# Patient Record
Sex: FEMALE | Race: ASIAN | Employment: OTHER | URBAN - METROPOLITAN AREA
[De-identification: names, ages, dates, MRNs, and addresses within clinical notes are randomized per-mention and may not be internally consistent; named-entity substitution may affect disease eponyms.]

---

## 2017-06-12 ENCOUNTER — OFFICE VISIT (OUTPATIENT)
Dept: FAMILY MEDICINE CLINIC | Facility: CLINIC | Age: 57
End: 2017-06-12

## 2017-06-12 VITALS
WEIGHT: 130 LBS | DIASTOLIC BLOOD PRESSURE: 80 MMHG | BODY MASS INDEX: 26.56 KG/M2 | TEMPERATURE: 97 F | HEART RATE: 76 BPM | SYSTOLIC BLOOD PRESSURE: 124 MMHG | HEIGHT: 58.5 IN | RESPIRATION RATE: 14 BRPM

## 2017-06-12 DIAGNOSIS — E11.21 TYPE 2 DIABETES MELLITUS WITH DIABETIC NEPHROPATHY, WITHOUT LONG-TERM CURRENT USE OF INSULIN (HCC): ICD-10-CM

## 2017-06-12 DIAGNOSIS — I10 ESSENTIAL HYPERTENSION: ICD-10-CM

## 2017-06-12 DIAGNOSIS — M81.8 OTHER OSTEOPOROSIS WITHOUT CURRENT PATHOLOGICAL FRACTURE: ICD-10-CM

## 2017-06-12 DIAGNOSIS — Z00.00 LABORATORY EXAM ORDERED AS PART OF ROUTINE GENERAL MEDICAL EXAMINATION: ICD-10-CM

## 2017-06-12 DIAGNOSIS — E78.2 MIXED HYPERLIPIDEMIA: ICD-10-CM

## 2017-06-12 DIAGNOSIS — M25.552 LEFT HIP PAIN: ICD-10-CM

## 2017-06-12 DIAGNOSIS — Z12.31 VISIT FOR SCREENING MAMMOGRAM: ICD-10-CM

## 2017-06-12 DIAGNOSIS — E03.9 HYPOTHYROIDISM, UNSPECIFIED TYPE: ICD-10-CM

## 2017-06-12 DIAGNOSIS — Z01.419 WELL WOMAN EXAM: Primary | ICD-10-CM

## 2017-06-12 DIAGNOSIS — Z12.11 SCREENING FOR COLON CANCER: ICD-10-CM

## 2017-06-12 PROCEDURE — 99386 PREV VISIT NEW AGE 40-64: CPT | Performed by: PHYSICIAN ASSISTANT

## 2017-06-12 RX ORDER — LEVOTHYROXINE SODIUM 0.05 MG/1
50 TABLET ORAL
Qty: 90 TABLET | Refills: 3 | Status: SHIPPED | OUTPATIENT
Start: 2017-06-12 | End: 2017-08-14

## 2017-06-14 ENCOUNTER — HOSPITAL ENCOUNTER (OUTPATIENT)
Dept: PHYSICAL THERAPY | Facility: HOSPITAL | Age: 57
Setting detail: THERAPIES SERIES
Discharge: HOME | End: 2017-06-14
Attending: PHYSICIAN ASSISTANT
Payer: COMMERCIAL

## 2017-06-14 DIAGNOSIS — M25.552 LEFT HIP PAIN: Primary | ICD-10-CM

## 2017-06-14 PROCEDURE — 97162 PT EVAL MOD COMPLEX 30 MIN: CPT

## 2017-06-14 PROCEDURE — 97110 THERAPEUTIC EXERCISES: CPT

## 2017-06-14 PROCEDURE — 97140 MANUAL THERAPY 1/> REGIONS: CPT

## 2017-06-14 NOTE — PROGRESS NOTES
LOWER EXTREMITY EVALUATION:   Referring Physician: Dr. Shelly Pineda  Diagnosis: Left hip pain     Date of Service: 6/14/2017     PATIENT Eder Leonardo is a 56year old y/o female who presents to therapy today with complaints of bilateral hip pain.  Alexandria Diaz (B)  Sensation: unremarkable    Lumbar ROM:  Flexion: reaches to the feet level, no pain  Extension: asymmetric (less on L)   Side-bending: reaches knee joint line (B)   Rotation: R: provokes R hip pain, mild; L: provokes R hip pain, stronger    Hip ROM: stretch    Charges: PT Eval Moderate Complexity, man x 1 10, there ex x 1 10'      Total Timed Treatment: 20 min     Total Treatment Time: 50 min     PLAN OF CARE:    Goals:    · Pt will demonstrate improved flexibility in L ITB, piriformis and gluteal mus care.    X___________________________________________________ Date____________________    Certification From: 6/17/7995  To:9/12/2017

## 2017-06-15 ENCOUNTER — HOSPITAL ENCOUNTER (OUTPATIENT)
Dept: BONE DENSITY | Age: 57
Discharge: HOME | End: 2017-06-15
Attending: PHYSICIAN ASSISTANT
Payer: COMMERCIAL

## 2017-06-15 ENCOUNTER — HOSPITAL ENCOUNTER (OUTPATIENT)
Dept: MAMMOGRAPHY | Age: 57
Discharge: HOME | End: 2017-06-15
Attending: PHYSICIAN ASSISTANT
Payer: COMMERCIAL

## 2017-06-15 DIAGNOSIS — Z12.31 VISIT FOR SCREENING MAMMOGRAM: ICD-10-CM

## 2017-06-15 DIAGNOSIS — M81.8 OTHER OSTEOPOROSIS WITHOUT CURRENT PATHOLOGICAL FRACTURE: ICD-10-CM

## 2017-06-15 PROCEDURE — 77080 DXA BONE DENSITY AXIAL: CPT | Performed by: PHYSICIAN ASSISTANT

## 2017-06-15 PROCEDURE — 77067 SCR MAMMO BI INCL CAD: CPT | Performed by: PHYSICIAN ASSISTANT

## 2017-06-15 PROCEDURE — 77063 BREAST TOMOSYNTHESIS BI: CPT | Performed by: PHYSICIAN ASSISTANT

## 2017-06-19 ENCOUNTER — HOSPITAL ENCOUNTER (OUTPATIENT)
Dept: PHYSICAL THERAPY | Facility: HOSPITAL | Age: 57
Setting detail: THERAPIES SERIES
Discharge: HOME | End: 2017-06-19
Attending: PHYSICIAN ASSISTANT
Payer: COMMERCIAL

## 2017-06-19 PROCEDURE — 97110 THERAPEUTIC EXERCISES: CPT

## 2017-06-19 PROCEDURE — 97140 MANUAL THERAPY 1/> REGIONS: CPT

## 2017-06-19 NOTE — PROGRESS NOTES
Dx: Left hip pain         Authorized # of Visits:  na         Next MD visit: none scheduled  Fall Risk: standard         Precautions: n/a             Subjective: Tingling in L lower leg is a bit less. L hip pain is the same.      Objective: See flowchart fo L long axis traction 10 x 10 sec         L hip caudal and lateral glide via femur with lower leg on PT's shoulder         FADDIR stretch (decreased pain after hip mobs)         FADDIR stretch with mm energy         FLORIN stretch         Rotational lumbar

## 2017-06-24 ENCOUNTER — LAB ENCOUNTER (OUTPATIENT)
Dept: LAB | Age: 57
End: 2017-06-24
Attending: PHYSICIAN ASSISTANT
Payer: COMMERCIAL

## 2017-06-24 DIAGNOSIS — E11.21 TYPE 2 DIABETES MELLITUS WITH DIABETIC NEPHROPATHY, WITHOUT LONG-TERM CURRENT USE OF INSULIN (HCC): ICD-10-CM

## 2017-06-24 DIAGNOSIS — E03.9 HYPOTHYROIDISM, UNSPECIFIED TYPE: ICD-10-CM

## 2017-06-24 DIAGNOSIS — Z00.00 LABORATORY EXAM ORDERED AS PART OF ROUTINE GENERAL MEDICAL EXAMINATION: ICD-10-CM

## 2017-06-24 DIAGNOSIS — E78.2 MIXED HYPERLIPIDEMIA: ICD-10-CM

## 2017-06-24 PROCEDURE — 82043 UR ALBUMIN QUANTITATIVE: CPT | Performed by: PHYSICIAN ASSISTANT

## 2017-06-24 PROCEDURE — 83036 HEMOGLOBIN GLYCOSYLATED A1C: CPT | Performed by: PHYSICIAN ASSISTANT

## 2017-06-24 PROCEDURE — 80050 GENERAL HEALTH PANEL: CPT | Performed by: PHYSICIAN ASSISTANT

## 2017-06-24 PROCEDURE — 80061 LIPID PANEL: CPT | Performed by: PHYSICIAN ASSISTANT

## 2017-06-24 PROCEDURE — 36415 COLL VENOUS BLD VENIPUNCTURE: CPT | Performed by: PHYSICIAN ASSISTANT

## 2017-06-24 PROCEDURE — 82607 VITAMIN B-12: CPT | Performed by: PHYSICIAN ASSISTANT

## 2017-06-24 PROCEDURE — 82570 ASSAY OF URINE CREATININE: CPT | Performed by: PHYSICIAN ASSISTANT

## 2017-06-24 PROCEDURE — 82306 VITAMIN D 25 HYDROXY: CPT | Performed by: PHYSICIAN ASSISTANT

## 2017-06-26 ENCOUNTER — HOSPITAL ENCOUNTER (OUTPATIENT)
Dept: PHYSICAL THERAPY | Facility: HOSPITAL | Age: 57
Setting detail: THERAPIES SERIES
Discharge: HOME | End: 2017-06-26
Attending: PHYSICIAN ASSISTANT
Payer: COMMERCIAL

## 2017-06-26 PROCEDURE — 97110 THERAPEUTIC EXERCISES: CPT

## 2017-06-26 PROCEDURE — 97140 MANUAL THERAPY 1/> REGIONS: CPT

## 2017-06-26 NOTE — PROGRESS NOTES
Dx: Left hip pain         Authorized # of Visits:  na         Next MD visit: none scheduled  Fall Risk: standard         Precautions: n/a             Subjective: worked over the weekend; felt stiffness in lower back and started feeling tingling in left low PA mobs Gr III S1 - T12  Prone press-ups 2 x 10         FADDIR stretch with mm energy 1', no mm energy        FLORIN stretch 1'        Rotational lumbar mobilization, L side up, Gr  bouts        Clam shells, eccentric with manual trunk stabilization

## 2017-06-27 ENCOUNTER — TELEPHONE (OUTPATIENT)
Dept: FAMILY MEDICINE CLINIC | Facility: CLINIC | Age: 57
End: 2017-06-27

## 2017-06-27 DIAGNOSIS — E11.39 TYPE 2 DIABETES MELLITUS WITH OTHER OPHTHALMIC COMPLICATION, UNSPECIFIED LONG TERM INSULIN USE STATUS: Primary | ICD-10-CM

## 2017-06-27 DIAGNOSIS — E55.9 VITAMIN D DEFICIENCY: ICD-10-CM

## 2017-06-28 ENCOUNTER — APPOINTMENT (OUTPATIENT)
Dept: PHYSICAL THERAPY | Facility: HOSPITAL | Age: 57
End: 2017-06-28
Attending: PHYSICIAN ASSISTANT
Payer: COMMERCIAL

## 2017-06-29 ENCOUNTER — APPOINTMENT (OUTPATIENT)
Dept: PHYSICAL THERAPY | Facility: HOSPITAL | Age: 57
End: 2017-06-29
Attending: PHYSICIAN ASSISTANT
Payer: COMMERCIAL

## 2017-07-05 PROBLEM — H25.813 COMBINED FORMS OF AGE-RELATED CATARACT OF BOTH EYES: Status: ACTIVE | Noted: 2017-07-05

## 2017-07-10 ENCOUNTER — HOSPITAL ENCOUNTER (OUTPATIENT)
Dept: PHYSICAL THERAPY | Facility: HOSPITAL | Age: 57
Setting detail: THERAPIES SERIES
Discharge: HOME | End: 2017-07-10
Attending: PHYSICIAN ASSISTANT
Payer: COMMERCIAL

## 2017-07-10 PROCEDURE — 97140 MANUAL THERAPY 1/> REGIONS: CPT

## 2017-07-10 PROCEDURE — 97110 THERAPEUTIC EXERCISES: CPT

## 2017-07-10 NOTE — PROGRESS NOTES
Dx: Left hip pain         Authorized # of Visits:  na         Next MD visit: none scheduled  Fall Risk: standard         Precautions: n/a             Subjective: L hip and lower leg feel the same. Develops symptoms worsening with standing.  No tingling at t after hip mobs) Prone L PA mobs Gr III S1 - T12  Prone press-ups 2 x 10  Prone L PA mobs Gr III S1 - T12  Prone press-ups 2 x 10        FADDIR stretch with mm energy 1', no mm energy L hip FL stretch in modified Jace position with STM       FLORIN stretch

## 2017-07-12 ENCOUNTER — HOSPITAL ENCOUNTER (OUTPATIENT)
Dept: PHYSICAL THERAPY | Facility: HOSPITAL | Age: 57
Setting detail: THERAPIES SERIES
Discharge: HOME | End: 2017-07-12
Attending: PHYSICIAN ASSISTANT
Payer: COMMERCIAL

## 2017-07-12 PROCEDURE — 97110 THERAPEUTIC EXERCISES: CPT

## 2017-07-12 PROCEDURE — 97140 MANUAL THERAPY 1/> REGIONS: CPT

## 2017-07-12 NOTE — PROGRESS NOTES
Dx: Left hip pain         Authorized # of Visits:  na         Next MD visit: none scheduled  Fall Risk: standard         Precautions: n/a             Subjective: L hip feels a little bit better. Noted some pain on R side.      Objective: See flowchart for t shoulder --      FADDIR stretch (decreased pain after hip mobs) Prone L PA mobs Gr III S1 - T12  Prone press-ups 2 x 10  Prone L PA mobs Gr III S1 - T12  Prone press-ups 2 x 10  Gr III        2 x 10, form correction      FADDIR stretch with mm energy 1', n

## 2017-07-17 ENCOUNTER — HOSPITAL ENCOUNTER (OUTPATIENT)
Dept: PHYSICAL THERAPY | Facility: HOSPITAL | Age: 57
Setting detail: THERAPIES SERIES
Discharge: HOME | End: 2017-07-17
Attending: PHYSICIAN ASSISTANT
Payer: COMMERCIAL

## 2017-07-17 PROCEDURE — 97140 MANUAL THERAPY 1/> REGIONS: CPT

## 2017-07-17 PROCEDURE — 97110 THERAPEUTIC EXERCISES: CPT

## 2017-07-17 NOTE — PROGRESS NOTES
Dx: Left hip pain         Authorized # of Visits:  na         Next MD visit: none scheduled  Fall Risk: standard         Precautions: n/a             Subjective: Some stiffness in lower back; L hip feels the same.  R hip feels better compared to last sessio sec L hip caudal and lateral glide via femur with lower leg on PT's shoulder -- Gr III     FADDIR stretch (decreased pain after hip mobs) Prone L PA mobs Gr III S1 - T12  Prone press-ups 2 x 10  Prone L PA mobs Gr III S1 - T12  Prone press-ups 2 x 10  Gr I

## 2017-07-18 ENCOUNTER — OFFICE VISIT (OUTPATIENT)
Dept: ENDOCRINOLOGY CLINIC | Facility: CLINIC | Age: 57
End: 2017-07-18

## 2017-07-18 VITALS
SYSTOLIC BLOOD PRESSURE: 122 MMHG | HEIGHT: 59 IN | WEIGHT: 134 LBS | TEMPERATURE: 98 F | BODY MASS INDEX: 27.01 KG/M2 | DIASTOLIC BLOOD PRESSURE: 78 MMHG | RESPIRATION RATE: 18 BRPM | HEART RATE: 88 BPM

## 2017-07-18 DIAGNOSIS — E11.39 TYPE 2 DIABETES MELLITUS WITH OTHER OPHTHALMIC COMPLICATION, UNSPECIFIED LONG TERM INSULIN USE STATUS: Primary | ICD-10-CM

## 2017-07-18 PROCEDURE — 99213 OFFICE O/P EST LOW 20 MIN: CPT | Performed by: NURSE PRACTITIONER

## 2017-07-18 RX ORDER — LANCETS 33 GAUGE
1 EACH MISCELLANEOUS DAILY
Qty: 1 BOX | Refills: 3 | Status: SHIPPED | OUTPATIENT
Start: 2017-07-18 | End: 2018-03-07

## 2017-07-18 NOTE — PROGRESS NOTES
CC: Patient presents with:  Diabetes: new pt. referred by PCP. pt does not have meter.       HISTORY:  Past Medical History:   Diagnosis Date   • DIABETES    • HYPERLIPIDEMIA    • HYPERTENSION    • Type II or unspecified type diabetes mellitus without menti Negative for hearing loss, lymphadenopathy and dental problem. Eyes: Negative for visual disturbance. Last eye exam 7/5/17  with [place] [facility]   Respiratory: Negative for cough, chest tightness, shortness of breath and wheezing.    Cardiovascular: Negative for ch atraumatic. Eyes: Conjunctivae are normal.   Neck: Normal range of motion. Neck supple. Normal carotid pulses. Cardiovascular: Normal rate, regular rhythm and intact distal pulses. No murmur, rubs or gallops.    Pulmonary/Chest: Effort normal and [place] glucose logs in 3 months     Return in about 3 months (around 10/18/2017) for diabetes. Pt verbalized understanding and has no further questions at this time. Greater than 50% of visit spent on education and counseling.     Katy BAUER,VITALIYE

## 2017-07-18 NOTE — PROGRESS NOTES
Taught pt Michael Contour next meter, gave log sheet, advised her to test BG once daily at varying times. Current BG in office 2 hours post-L: 119. Gave balanced plate and [place] ([facility]) list of foods.   Also gave list of carb content of foods as she asked about ban

## 2017-07-18 NOTE — PATIENT INSTRUCTIONS
Test sugar daily vary times, look at trends and adjust foods accordingly   Goal 30 min daily activity   Recheck A1C in September, if not better, return   Call with questions/concerns

## 2017-07-19 ENCOUNTER — TELEPHONE (OUTPATIENT)
Dept: FAMILY MEDICINE CLINIC | Facility: CLINIC | Age: 57
End: 2017-07-19

## 2017-07-19 ENCOUNTER — APPOINTMENT (OUTPATIENT)
Dept: PHYSICAL THERAPY | Facility: HOSPITAL | Age: 57
End: 2017-07-19
Attending: PHYSICIAN ASSISTANT
Payer: COMMERCIAL

## 2017-07-19 NOTE — TELEPHONE ENCOUNTER
Patient states had a insect bite Saturday and leg keeps swelling, Dr Kisha Richey has not open appointments for today. Wants to know if there is anything that can be suggested to take over the counter.

## 2017-07-19 NOTE — TELEPHONE ENCOUNTER
Patient called- insect bite to right leg near ankle- was swollen- now looks like cellulitis. Patient instructed to get eval at E/R or urgent care.

## 2017-07-24 ENCOUNTER — HOSPITAL ENCOUNTER (OUTPATIENT)
Dept: PHYSICAL THERAPY | Facility: HOSPITAL | Age: 57
Setting detail: THERAPIES SERIES
Discharge: HOME | End: 2017-07-24
Attending: PHYSICIAN ASSISTANT
Payer: COMMERCIAL

## 2017-07-24 PROCEDURE — 97110 THERAPEUTIC EXERCISES: CPT

## 2017-07-24 PROCEDURE — 97140 MANUAL THERAPY 1/> REGIONS: CPT

## 2017-07-24 NOTE — PROGRESS NOTES
Dx: Left hip pain         Authorized # of Visits:  na         Next MD visit: none scheduled  Fall Risk: standard         Precautions: n/a             Subjective: Pt states that lower back and left hip seem to feel a bit better.  She did have a bit of tingli 10 x 10 sec Stationary bike L2 4' NU step L 5 5' L5 5' L5 5' L5 5'    L hip caudal and lateral glide via femur with lower leg on PT's shoulder L long axis traction 10 x 10 sec L hip caudal and lateral glide via femur with lower leg on PT's shoulder -- Gr I -- -- -- SB push-ups at the wall x 15 SB sits at the wall x 10   T board calf stretch 1'    SLB: R 11 sec, L 17 sec HMP 15' 15' 15' -- 15'    Skilled Services: pt education, manual therapy, progression of there ex    Charges: man x 1 15', there ex 2 25'

## 2017-07-27 ENCOUNTER — TELEPHONE (OUTPATIENT)
Dept: INTERNAL MEDICINE CLINIC | Facility: CLINIC | Age: 57
End: 2017-07-27

## 2017-07-27 NOTE — TELEPHONE ENCOUNTER
Left 50 Contour Strips at  for patient to . Can give patient more after August 1st to help replace the 200 the were prescribed incorrectly and she could not return.

## 2017-08-07 ENCOUNTER — HOSPITAL ENCOUNTER (OUTPATIENT)
Dept: PHYSICAL THERAPY | Facility: HOSPITAL | Age: 57
Setting detail: THERAPIES SERIES
Discharge: HOME | End: 2017-08-07
Attending: PHYSICIAN ASSISTANT
Payer: COMMERCIAL

## 2017-08-07 PROCEDURE — 97110 THERAPEUTIC EXERCISES: CPT

## 2017-08-07 PROCEDURE — 97140 MANUAL THERAPY 1/> REGIONS: CPT

## 2017-08-07 NOTE — PROGRESS NOTES
Dx: Left hip pain         Authorized # of Visits:  na         Next MD visit: none scheduled  Fall Risk: standard         Precautions: n/a             Subjective: Reports that pain is mostly in L-sided upper lumbar spine; there is less hip pain and only occ lower leg on PT's shoulder L long axis traction 10 x 10 sec L hip caudal and lateral glide via femur with lower leg on PT's shoulder -- Gr III L FADDIR/FLORIN/piriformis stretch 1' ea 1' ea   FADDIR stretch (decreased pain after hip mobs) Prone L PA mobs Gr up the wall x 10 SB push ups x 10  x10   HEP update: bridge, clam shells -- -- -- SB push-ups at the wall x 15 SB sits at the wall x 10   T board calf stretch 1' X 10   SLB: R 11 sec, L 17 sec HMP 15' 15' 15' -- 15' 15'   Skilled Services: pt education, ma

## 2017-08-09 ENCOUNTER — HOSPITAL ENCOUNTER (OUTPATIENT)
Dept: PHYSICAL THERAPY | Facility: HOSPITAL | Age: 57
Setting detail: THERAPIES SERIES
Discharge: HOME | End: 2017-08-09
Attending: PHYSICIAN ASSISTANT
Payer: COMMERCIAL

## 2017-08-09 PROCEDURE — 97110 THERAPEUTIC EXERCISES: CPT

## 2017-08-09 PROCEDURE — 97140 MANUAL THERAPY 1/> REGIONS: CPT

## 2017-08-09 NOTE — PROGRESS NOTES
Dx: Left hip pain         Authorized # of Visits:  na         Next MD visit: none scheduled  Fall Risk: standard         Precautions: n/a             Subjective: No tingling. There is some L-sided pain in mid/lower back.  L hip feels fine but she did have s FADDIR stretch (decreased pain after hip mobs) Prone L PA mobs Gr III S1 - T12  Prone press-ups 2 x 10  Prone L PA mobs Gr III S1 - T12  Prone press-ups 2 x 10  Gr III        2 x 10, form correction Thoracic spine mobilization over 1/2 foam roll        - 10  x10 --   HEP update: [place], clam marci -- -- -- SB push-ups at the wall x 15 SB sits at the wall x 10   T board calf stretch 1' X 10 --   SLB: R 11 sec, L 17 sec [facility] 15' 15' 15' -- 15' 15' --   Skilled Services: pt education, manual therapy, progress

## 2017-08-14 ENCOUNTER — TELEPHONE (OUTPATIENT)
Dept: FAMILY MEDICINE CLINIC | Facility: CLINIC | Age: 57
End: 2017-08-14

## 2017-08-14 DIAGNOSIS — E03.9 HYPOTHYROIDISM, UNSPECIFIED TYPE: ICD-10-CM

## 2017-08-14 RX ORDER — LEVOTHYROXINE SODIUM 0.05 MG/1
50 TABLET ORAL
Qty: 90 TABLET | Refills: 1 | Status: SHIPPED | OUTPATIENT
Start: 2017-08-14 | End: 2018-03-01

## 2017-08-14 NOTE — TELEPHONE ENCOUNTER
Pt called to request a new prescription refill for her levothyroxine medication. Pt asked to please send it to her [facility] pharmacy. Please call pt and advise.

## 2017-08-16 ENCOUNTER — HOSPITAL ENCOUNTER (OUTPATIENT)
Dept: PHYSICAL THERAPY | Facility: HOSPITAL | Age: 57
Setting detail: THERAPIES SERIES
Discharge: HOME | End: 2017-08-16
Attending: PHYSICIAN ASSISTANT
Payer: COMMERCIAL

## 2017-08-16 PROCEDURE — 97110 THERAPEUTIC EXERCISES: CPT

## 2017-08-16 PROCEDURE — 97140 MANUAL THERAPY 1/> REGIONS: CPT

## 2017-08-18 NOTE — PROGRESS NOTES
Progress Physical Therapy Summary    Pt has attended 10 visits in Physical Therapy. Subjective: Luba reports some improvement with pain in L hip and L lower leg tingling.  She states that she experiences tingling rarely and to the lesser extent; most (was: some ROM restrictions, provokes L hip pain)     Accessory motion: central and unilateral PA hypomobility at T11/12, T12/L1, L1/2, L2/3 without pain, PPIVM hypomobility for lumbar rotation to L  Flexibility:  Hip Flexor: R some restrictions, L moderat program; Neuromuscular re-education for proprioceptive/balance training, pelvic and core stabilization; Pt. education for posture, body mechanics, and ergonomics, Modalities as needed (including heat/cold and e-stim for pain relief), HEP instruction and pr energy L hip FL stretch in modified Jace position with STM 3' L clam shells with RTB 3 x 10  GTB 2 x 10, 2 sec hold 3 x 10  -- 3 x 10, L   FLORIN stretch 1' Lt ITB, piriformis, gluteals, quad STM and foam rolling 5' 5' 7' 5' -- --   Rotational lumbar mobi stretch 1' X 10 -- --   SLB: R 11 sec, L 17 sec [facility] 15' 15' 15' -- 15' 15' -- 15'   Skilled Services: pt education, manual therapy, progression of there ex    Charges: man x 1 15', there ex 2 25'       Total Timed Treatment: 40 min  Total Treatment Time: 4

## 2017-08-31 ENCOUNTER — HOSPITAL ENCOUNTER (OUTPATIENT)
Dept: PHYSICAL THERAPY | Facility: HOSPITAL | Age: 57
Setting detail: THERAPIES SERIES
Discharge: HOME | End: 2017-08-31
Attending: PHYSICIAN ASSISTANT
Payer: COMMERCIAL

## 2017-08-31 PROCEDURE — 97140 MANUAL THERAPY 1/> REGIONS: CPT

## 2017-08-31 PROCEDURE — 97110 THERAPEUTIC EXERCISES: CPT

## 2017-08-31 NOTE — PROGRESS NOTES
Dx: Left hip pain         Authorized # of Visits:  na         Next MD visit: none scheduled  Fall Risk: standard         Precautions: n/a             Subjective: Tingling is less. L hip or lower thoracic/upper lumbar spine pain is on and off.  Used a step s femur with lower leg on PT's shoulder L long axis traction 10 x 10 sec L FADDIR/FLORIN/piriformis stretch 1' ea 1' ea 1' ea L hip long axis traction 5 x 1' --   FADDIR stretch (decreased pain after hip mobs) Prone L PA mobs Gr III S1 - T12  Prone press-ups update: [place], clam shells -- SB sits at the wall x 10   T board calf stretch 1' X 10 -- -- --   SLB: R 11 sec, L 17 sec [facility] 15' 15' 15' -- 15' --   Skilled Services: pt education, manual therapy, progression of there ex    Charges: man x 1 15', there ex

## 2017-09-06 ENCOUNTER — HOSPITAL ENCOUNTER (OUTPATIENT)
Dept: PHYSICAL THERAPY | Facility: HOSPITAL | Age: 57
Setting detail: THERAPIES SERIES
Discharge: HOME | End: 2017-09-06
Attending: PHYSICIAN ASSISTANT
Payer: COMMERCIAL

## 2017-09-06 PROCEDURE — 97140 MANUAL THERAPY 1/> REGIONS: CPT

## 2017-09-06 PROCEDURE — 97110 THERAPEUTIC EXERCISES: CPT

## 2017-09-06 NOTE — PROGRESS NOTES
Dx: Left hip pain         Authorized # of Visits:  na         Next MD visit: none scheduled  Fall Risk: standard         Precautions: n/a             Subjective: No tingling for the past few days.  Standing at work varies depending on the bench that she get with lower leg on PT's shoulder L long axis traction 10 x 10 sec L FADDIR/FLORIN/piriformis stretch 1' ea 1' ea 1' ea L hip long axis traction 5 x 1' -- --   FADDIR stretch (decreased pain after hip mobs) Prone L PA mobs Gr III S1 - T12  Prone press-ups 2 x x 10 Modified Jace position hip flexors stretch (B) (tight on L)   Bridges x 15 -- SB push ups x 10  x10 -- -- Thoracic spine mobilization over 1/2 foam roll 1/2 foam roll   HEP update: bridge, clam shells -- SB sits at the wall x 10   T board calf stret

## 2017-09-08 ENCOUNTER — HOSPITAL ENCOUNTER (OUTPATIENT)
Dept: PHYSICAL THERAPY | Facility: HOSPITAL | Age: 57
Setting detail: THERAPIES SERIES
Discharge: HOME | End: 2017-09-08
Attending: PHYSICIAN ASSISTANT
Payer: COMMERCIAL

## 2017-09-08 PROCEDURE — 97110 THERAPEUTIC EXERCISES: CPT

## 2017-09-08 PROCEDURE — 97140 MANUAL THERAPY 1/> REGIONS: CPT

## 2017-09-08 NOTE — PROGRESS NOTES
Dx: Left hip pain         Authorized # of Visits:  na         Next MD visit: none scheduled  Fall Risk: standard         Precautions: n/a             Subjective: No tingling for the past few days.  L hip feels better but continues to feel sore on the left s PT's shoulder L long axis traction 10 x 10 sec L FADDIR/FLORIN/piriformis stretch 1' ea L hip long axis traction 5 x 1' -- -- L hip long axis traction 10 x 20 sec   FADDIR stretch (decreased pain after hip mobs) Prone L PA mobs Gr III S1 - T12  Prone press- extension with knee bent 2 x 10   L sciatic nerve mobs 3' SB rolls up the wall x 10 -- SB rolls up the wall x 10 Modified Jace position hip flexors stretch (B) (tight on L) Over SB arm/leg raises x 10 L/R   Bridges x 15 -- SB push ups x 10  -- Thoracic s

## 2017-09-11 ENCOUNTER — HOSPITAL ENCOUNTER (OUTPATIENT)
Dept: PHYSICAL THERAPY | Facility: HOSPITAL | Age: 57
Setting detail: THERAPIES SERIES
Discharge: HOME | End: 2017-09-11
Attending: PHYSICIAN ASSISTANT
Payer: COMMERCIAL

## 2017-09-11 PROCEDURE — 97110 THERAPEUTIC EXERCISES: CPT

## 2017-09-11 PROCEDURE — 97140 MANUAL THERAPY 1/> REGIONS: CPT

## 2017-09-11 NOTE — PROGRESS NOTES
Dx: Left hip pain         Authorized # of Visits:  na         Next MD visit: none scheduled  Fall Risk: standard         Precautions: n/a             Subjective: Back feels better today. No hip pain or tingling; has not worked since last session.      Martin Olivier 10 sec L FADDIR/FLORIN/piriformis stretch 1' ea L hip long axis traction 5 x 1' -- -- L hip long axis traction 10 x 20 sec 10 x 10 sec   FADDIR stretch (decreased pain after hip mobs) Prone L PA mobs Gr III S1 - T12  Prone press-ups 2 x 10  1/2 foam roll X Prone central PA mobs Gr III L4/5 and L5/S1  Prone press-ups x 10 Bridge x 15 Quadruped camel-cat x 10    Quadruped arm/leg raise x 10 L/R High kneeling L lumbar rotation with 3# resistance X 15 L/R X 15 L/R Prone L hip extension with knee bent 2 x 10 Br

## 2017-09-12 ENCOUNTER — APPOINTMENT (OUTPATIENT)
Dept: PHYSICAL THERAPY | Facility: HOSPITAL | Age: 57
End: 2017-09-12
Attending: PHYSICIAN ASSISTANT
Payer: COMMERCIAL

## 2017-09-14 ENCOUNTER — APPOINTMENT (OUTPATIENT)
Dept: PHYSICAL THERAPY | Facility: HOSPITAL | Age: 57
End: 2017-09-14
Attending: PHYSICIAN ASSISTANT
Payer: COMMERCIAL

## 2017-09-15 ENCOUNTER — APPOINTMENT (OUTPATIENT)
Dept: PHYSICAL THERAPY | Facility: HOSPITAL | Age: 57
End: 2017-09-15
Attending: PHYSICIAN ASSISTANT
Payer: COMMERCIAL

## 2017-10-02 ENCOUNTER — OFFICE VISIT (OUTPATIENT)
Dept: RHEUMATOLOGY | Facility: CLINIC | Age: 57
End: 2017-10-02

## 2017-10-02 VITALS
SYSTOLIC BLOOD PRESSURE: 138 MMHG | DIASTOLIC BLOOD PRESSURE: 88 MMHG | RESPIRATION RATE: 16 BRPM | WEIGHT: 133 LBS | HEIGHT: 59 IN | HEART RATE: 64 BPM | BODY MASS INDEX: 26.81 KG/M2

## 2017-10-02 DIAGNOSIS — I10 ESSENTIAL HYPERTENSION: ICD-10-CM

## 2017-10-02 DIAGNOSIS — E03.9 HYPOTHYROIDISM, UNSPECIFIED TYPE: ICD-10-CM

## 2017-10-02 DIAGNOSIS — M81.0 AGE-RELATED OSTEOPOROSIS WITHOUT CURRENT PATHOLOGICAL FRACTURE: Primary | ICD-10-CM

## 2017-10-02 DIAGNOSIS — E78.49 OTHER HYPERLIPIDEMIA: ICD-10-CM

## 2017-10-02 PROCEDURE — 99244 OFF/OP CNSLTJ NEW/EST MOD 40: CPT | Performed by: INTERNAL MEDICINE

## 2017-10-02 RX ORDER — RISEDRONATE SODIUM 150 MG/1
150 TABLET, FILM COATED ORAL
Qty: 3 TABLET | Refills: 3 | Status: SHIPPED | OUTPATIENT
Start: 2017-10-02 | End: 2018-03-07

## 2017-10-02 NOTE — PATIENT INSTRUCTIONS
Current problem is osteoporosis in the lumbar spine. Bone density is -3.2 which means roughly 32 % less bone present. Your Vit D level was low at 25, normal is above 30. Take Calcium with Vit D daily. Also take 1,000 units of Vitamin D daily.   Exercise

## 2018-01-18 NOTE — PROGRESS NOTES
[facility]  Report of Consultation    Ulis Page Patient Status:  No patient class for patient encounter    1960 MRN [id number]   Location [address] [place] [address] PCP Jose Pavon DO     Date of Consult:  10/2/17  Reason for Consultation: ost UV Protection Kit, SMBG BID-TID, Disp: 1 Kit, Rfl: 0  •  Blood Glucose Calibration (FREESTYLE CONTROL SOLUTION) In Vitro Liquid, Test with each new set of strips, Disp: 1 Each, Rfl: 3  •  FREESTYLE LANCETS Does not apply Misc, SMBG BID-TID, Disp: 100 Each, Rfl: 12    Re daily. Exercise by walking regularly. Take Actonel (risidronate) 150 mg once a month on an empty stomach first thing in there morning, wait at least 45 minutes before eating. Repeat bone density scan in one year. RTO 1 year.    Thank you for allowing me

## 2018-03-01 DIAGNOSIS — E03.9 HYPOTHYROIDISM, UNSPECIFIED TYPE: ICD-10-CM

## 2018-03-01 RX ORDER — LEVOTHYROXINE SODIUM 0.05 MG/1
TABLET ORAL
Qty: 90 TABLET | Refills: 1 | Status: SHIPPED | OUTPATIENT
Start: 2018-03-01 | End: 2019-03-15

## 2018-03-07 PROBLEM — M81.0 AGE-RELATED OSTEOPOROSIS WITHOUT CURRENT PATHOLOGICAL FRACTURE: Status: ACTIVE | Noted: 2018-03-07

## 2018-06-11 PROCEDURE — 82570 ASSAY OF URINE CREATININE: CPT | Performed by: FAMILY MEDICINE

## 2018-06-11 PROCEDURE — 82043 UR ALBUMIN QUANTITATIVE: CPT | Performed by: FAMILY MEDICINE

## 2018-06-15 PROBLEM — K76.0 FATTY LIVER DISEASE, NONALCOHOLIC: Status: ACTIVE | Noted: 2018-06-15

## 2018-06-15 PROBLEM — E55.9 VITAMIN D DEFICIENCY: Status: ACTIVE | Noted: 2018-06-15

## 2019-01-17 PROCEDURE — 87186 SC STD MICRODIL/AGAR DIL: CPT | Performed by: FAMILY MEDICINE

## 2019-01-17 PROCEDURE — 87086 URINE CULTURE/COLONY COUNT: CPT | Performed by: FAMILY MEDICINE

## 2019-03-15 DIAGNOSIS — E03.9 HYPOTHYROIDISM, UNSPECIFIED TYPE: ICD-10-CM

## 2019-03-15 PROBLEM — K21.9 GASTROESOPHAGEAL REFLUX DISEASE, ESOPHAGITIS PRESENCE NOT SPECIFIED: Status: ACTIVE | Noted: 2019-03-15

## 2019-03-15 RX ORDER — LEVOTHYROXINE SODIUM 0.05 MG/1
TABLET ORAL
Qty: 90 TABLET | Refills: 1 | Status: SHIPPED | OUTPATIENT
Start: 2019-03-15 | End: 2020-03-12

## 2019-08-22 PROBLEM — M54.32 LEFT SIDED SCIATICA: Status: ACTIVE | Noted: 2019-08-22

## 2019-08-22 PROBLEM — K21.9 GASTROESOPHAGEAL REFLUX DISEASE WITHOUT ESOPHAGITIS: Status: ACTIVE | Noted: 2019-03-15

## 2019-09-12 DIAGNOSIS — E03.9 HYPOTHYROIDISM, UNSPECIFIED TYPE: ICD-10-CM

## 2019-09-12 RX ORDER — LEVOTHYROXINE SODIUM 0.05 MG/1
TABLET ORAL
Qty: 90 TABLET | Refills: 1 | OUTPATIENT
Start: 2019-09-12

## 2019-10-02 PROBLEM — M43.17 SPONDYLOLISTHESIS AT L5-S1 LEVEL: Status: ACTIVE | Noted: 2019-10-02

## 2019-10-02 PROBLEM — M43.16 SPONDYLOLISTHESIS OF LUMBAR REGION: Status: ACTIVE | Noted: 2019-10-02

## 2019-10-02 PROBLEM — M54.16 LUMBAR RADICULITIS: Status: ACTIVE | Noted: 2019-10-02

## 2019-10-02 PROBLEM — M51.26 HNP (HERNIATED NUCLEUS PULPOSUS), LUMBAR: Status: ACTIVE | Noted: 2019-10-02

## 2019-10-02 PROBLEM — M43.06 SPONDYLOLYSIS OF LUMBAR REGION: Status: ACTIVE | Noted: 2019-10-02

## 2019-11-11 ENCOUNTER — APPOINTMENT (OUTPATIENT)
Dept: CT IMAGING | Facility: HOSPITAL | Age: 59
End: 2019-11-11
Attending: EMERGENCY MEDICINE
Payer: COMMERCIAL

## 2019-11-11 ENCOUNTER — HOSPITAL ENCOUNTER (EMERGENCY)
Facility: HOSPITAL | Age: 59
Discharge: HOME | End: 2019-11-11
Attending: EMERGENCY MEDICINE
Payer: COMMERCIAL

## 2019-11-11 VITALS
HEIGHT: 59 IN | SYSTOLIC BLOOD PRESSURE: 191 MMHG | HEART RATE: 82 BPM | BODY MASS INDEX: 27.01 KG/M2 | OXYGEN SATURATION: 98 % | TEMPERATURE: 98 F | WEIGHT: 134 LBS | RESPIRATION RATE: 15 BRPM | DIASTOLIC BLOOD PRESSURE: 104 MMHG

## 2019-11-11 DIAGNOSIS — I10 ESSENTIAL HYPERTENSION: Primary | ICD-10-CM

## 2019-11-11 PROCEDURE — 70450 CT HEAD/BRAIN W/O DYE: CPT | Performed by: EMERGENCY MEDICINE

## 2019-11-11 PROCEDURE — 93010 ELECTROCARDIOGRAM REPORT: CPT

## 2019-11-11 PROCEDURE — 96374 THER/PROPH/DIAG INJ IV PUSH: CPT

## 2019-11-11 PROCEDURE — 99285 EMERGENCY DEPT VISIT HI MDM: CPT

## 2019-11-11 PROCEDURE — 80053 COMPREHEN METABOLIC PANEL: CPT | Performed by: EMERGENCY MEDICINE

## 2019-11-11 PROCEDURE — 93005 ELECTROCARDIOGRAM TRACING: CPT

## 2019-11-11 PROCEDURE — 84484 ASSAY OF TROPONIN QUANT: CPT | Performed by: EMERGENCY MEDICINE

## 2019-11-11 PROCEDURE — 85025 COMPLETE CBC W/AUTO DIFF WBC: CPT | Performed by: EMERGENCY MEDICINE

## 2019-11-11 RX ORDER — METOPROLOL SUCCINATE 25 MG/1
25 TABLET, EXTENDED RELEASE ORAL ONCE
Status: COMPLETED | OUTPATIENT
Start: 2019-11-11 | End: 2019-11-11

## 2019-11-11 RX ORDER — METOPROLOL SUCCINATE 25 MG/1
25 TABLET, EXTENDED RELEASE ORAL DAILY
Qty: 30 TABLET | Refills: 0 | Status: SHIPPED | OUTPATIENT
Start: 2019-11-11 | End: 2019-11-14

## 2019-11-11 RX ORDER — LOSARTAN POTASSIUM 100 MG/1
100 TABLET ORAL DAILY
Qty: 30 TABLET | Refills: 0 | Status: SHIPPED | OUTPATIENT
Start: 2019-11-11 | End: 2019-12-11

## 2019-11-11 RX ORDER — METOPROLOL TARTRATE 5 MG/5ML
5 INJECTION INTRAVENOUS ONCE
Status: COMPLETED | OUTPATIENT
Start: 2019-11-11 | End: 2019-11-11

## 2019-11-11 NOTE — ED PROVIDER NOTES
Patient Seen in: [facility] Emergency Department      History   Patient presents with:  Headache (neurologic)  Hypertension (cardiovascular)    Stated Complaint: h/A HTN    HPI    Patient is a 61-year-old female comes to emergency room for evaluatio HPI.  Constitutional and vital signs reviewed. All other systems reviewed and negative except as noted above.     Physical Exam     ED Triage Vitals [11/11/19 1255]   BP (!) 193/106   Pulse 88   Resp 14   Temp 97.9 °F (36.6 °C)   Temp src Oral   SpO2 1 TROPONIN I - Normal   CBC WITH DIFFERENTIAL WITH PLATELET    Narrative: The following orders were created for panel order CBC WITH DIFFERENTIAL WITH PLATELET.   Procedure                               Abnormality         Status                     --- Chronic sinusitis changes in ethmoid sinuses. 2. No acute intracranial abnormality identified.     Dictated by: Tatum Mooney MD on 11/11/2019 at 13:56     Approved by: Tatum Mooney MD on 11/11/2019 at 14:02              Medications   metoprolol pm    Follow-up:  Anup Barber  [id number] [facility] [address]  [phone]    In 3 days          Medications Prescribed:  Discharge Medication List as of 11/11/2019  3:25 PM    START taking these medications    Metoprolol Moy Barker

## 2019-11-11 NOTE — ED INITIAL ASSESSMENT (HPI)
PT c/o headache for past 3-4 days, Armando has not been helping, PT checked blood pressure and it was 188/108 at Dr. Ranjith Cohen. Pt does take Losartan.  Prior to this B/P, Pt wanted it noted that she took 3x doses of Losartan v. 1 dose, which is her typical, over

## 2019-11-14 PROBLEM — R79.89 LFT ELEVATION: Status: ACTIVE | Noted: 2019-11-14

## 2019-11-14 PROBLEM — M54.2 MUSCULOSKELETAL NECK PAIN: Status: ACTIVE | Noted: 2019-11-14

## 2020-02-12 PROBLEM — K21.00 GASTROESOPHAGEAL REFLUX DISEASE WITH ESOPHAGITIS: Status: ACTIVE | Noted: 2020-02-12

## 2020-03-12 DIAGNOSIS — E03.9 HYPOTHYROIDISM, UNSPECIFIED TYPE: ICD-10-CM

## 2020-03-12 RX ORDER — LEVOTHYROXINE SODIUM 0.05 MG/1
TABLET ORAL
Qty: 90 TABLET | Refills: 1 | Status: SHIPPED | OUTPATIENT
Start: 2020-03-12

## 2020-06-12 ENCOUNTER — LAB ENCOUNTER (OUTPATIENT)
Dept: LAB | Facility: HOSPITAL | Age: 60
End: 2020-06-12
Attending: INTERNAL MEDICINE
Payer: COMMERCIAL

## 2020-06-12 DIAGNOSIS — R93.1 ELEVATED CORONARY ARTERY CALCIUM SCORE: ICD-10-CM

## 2020-06-15 ENCOUNTER — ANESTHESIA (OUTPATIENT)
Dept: CARDIAC SURGERY | Facility: HOSPITAL | Age: 60
DRG: 003 | End: 2020-06-15
Payer: COMMERCIAL

## 2020-06-15 ENCOUNTER — APPOINTMENT (OUTPATIENT)
Dept: INTERVENTIONAL RADIOLOGY/VASCULAR | Facility: HOSPITAL | Age: 60
DRG: 003 | End: 2020-06-15
Attending: INTERNAL MEDICINE
Payer: COMMERCIAL

## 2020-06-15 ENCOUNTER — HOSPITAL ENCOUNTER (INPATIENT)
Dept: INTERVENTIONAL RADIOLOGY/VASCULAR | Facility: HOSPITAL | Age: 60
LOS: 1 days | Discharge: ANOTHER HOSPITAL | DRG: 003 | End: 2020-06-17
Attending: INTERNAL MEDICINE | Admitting: INTERNAL MEDICINE
Payer: COMMERCIAL

## 2020-06-15 ENCOUNTER — APPOINTMENT (OUTPATIENT)
Dept: CT IMAGING | Facility: HOSPITAL | Age: 60
DRG: 003 | End: 2020-06-15
Attending: INTERNAL MEDICINE
Payer: COMMERCIAL

## 2020-06-15 ENCOUNTER — ANESTHESIA EVENT (OUTPATIENT)
Dept: CARDIAC SURGERY | Facility: HOSPITAL | Age: 60
DRG: 003 | End: 2020-06-15
Payer: COMMERCIAL

## 2020-06-15 DIAGNOSIS — R52 PAIN: ICD-10-CM

## 2020-06-15 DIAGNOSIS — R93.1 ELEVATED CORONARY ARTERY CALCIUM SCORE: Primary | ICD-10-CM

## 2020-06-15 PROCEDURE — 30233N1 TRANSFUSION OF NONAUTOLOGOUS RED BLOOD CELLS INTO PERIPHERAL VEIN, PERCUTANEOUS APPROACH: ICD-10-PCS | Performed by: INTERNAL MEDICINE

## 2020-06-15 PROCEDURE — 027035Z DILATION OF CORONARY ARTERY, ONE ARTERY WITH TWO DRUG-ELUTING INTRALUMINAL DEVICES, PERCUTANEOUS APPROACH: ICD-10-PCS | Performed by: INTERNAL MEDICINE

## 2020-06-15 PROCEDURE — B2111ZZ FLUOROSCOPY OF MULTIPLE CORONARY ARTERIES USING LOW OSMOLAR CONTRAST: ICD-10-PCS | Performed by: INTERNAL MEDICINE

## 2020-06-15 PROCEDURE — 30233R1 TRANSFUSION OF NONAUTOLOGOUS PLATELETS INTO PERIPHERAL VEIN, PERCUTANEOUS APPROACH: ICD-10-PCS | Performed by: INTERNAL MEDICINE

## 2020-06-15 PROCEDURE — B2151ZZ FLUOROSCOPY OF LEFT HEART USING LOW OSMOLAR CONTRAST: ICD-10-PCS | Performed by: INTERNAL MEDICINE

## 2020-06-15 PROCEDURE — 4A033BC MEASUREMENT OF ARTERIAL PRESSURE, CORONARY, PERCUTANEOUS APPROACH: ICD-10-PCS | Performed by: INTERNAL MEDICINE

## 2020-06-15 PROCEDURE — 30233L1 TRANSFUSION OF NONAUTOLOGOUS FRESH PLASMA INTO PERIPHERAL VEIN, PERCUTANEOUS APPROACH: ICD-10-PCS | Performed by: INTERNAL MEDICINE

## 2020-06-15 PROCEDURE — 06BQ0ZZ EXCISION OF LEFT SAPHENOUS VEIN, OPEN APPROACH: ICD-10-PCS | Performed by: THORACIC SURGERY (CARDIOTHORACIC VASCULAR SURGERY)

## 2020-06-15 PROCEDURE — S0028 INJECTION, FAMOTIDINE, 20 MG: HCPCS | Performed by: INTERNAL MEDICINE

## 2020-06-15 PROCEDURE — 5A1221Z PERFORMANCE OF CARDIAC OUTPUT, CONTINUOUS: ICD-10-PCS | Performed by: THORACIC SURGERY (CARDIOTHORACIC VASCULAR SURGERY)

## 2020-06-15 PROCEDURE — 36430 TRANSFUSION BLD/BLD COMPNT: CPT | Performed by: INTERNAL MEDICINE

## 2020-06-15 PROCEDURE — 93312 ECHO TRANSESOPHAGEAL: CPT | Performed by: INTERNAL MEDICINE

## 2020-06-15 PROCEDURE — 021109W BYPASS CORONARY ARTERY, TWO ARTERIES FROM AORTA WITH AUTOLOGOUS VENOUS TISSUE, OPEN APPROACH: ICD-10-PCS | Performed by: THORACIC SURGERY (CARDIOTHORACIC VASCULAR SURGERY)

## 2020-06-15 PROCEDURE — 02RX0JZ REPLACEMENT OF THORACIC AORTA, ASCENDING/ARCH WITH SYNTHETIC SUBSTITUTE, OPEN APPROACH: ICD-10-PCS | Performed by: THORACIC SURGERY (CARDIOTHORACIC VASCULAR SURGERY)

## 2020-06-15 PROCEDURE — B24BZZ4 ULTRASONOGRAPHY OF HEART WITH AORTA, TRANSESOPHAGEAL: ICD-10-PCS | Performed by: THORACIC SURGERY (CARDIOTHORACIC VASCULAR SURGERY)

## 2020-06-15 PROCEDURE — 4A023N7 MEASUREMENT OF CARDIAC SAMPLING AND PRESSURE, LEFT HEART, PERCUTANEOUS APPROACH: ICD-10-PCS | Performed by: INTERNAL MEDICINE

## 2020-06-15 PROCEDURE — 76942 ECHO GUIDE FOR BIOPSY: CPT | Performed by: INTERNAL MEDICINE

## 2020-06-15 PROCEDURE — 71275 CT ANGIOGRAPHY CHEST: CPT | Performed by: INTERNAL MEDICINE

## 2020-06-15 PROCEDURE — 5A1522F EXTRACORPOREAL OXYGENATION, MEMBRANE, CENTRAL: ICD-10-PCS | Performed by: THORACIC SURGERY (CARDIOTHORACIC VASCULAR SURGERY)

## 2020-06-15 DEVICE — IMPLANTABLE DEVICE: Type: IMPLANTABLE DEVICE | Site: CHEST | Status: FUNCTIONAL

## 2020-06-15 DEVICE — BIOGLUE OPEN HEART: Type: IMPLANTABLE DEVICE | Site: CHEST | Status: FUNCTIONAL

## 2020-06-15 DEVICE — IMPLANTABLE DEVICE: Type: IMPLANTABLE DEVICE | Status: FUNCTIONAL

## 2020-06-15 DEVICE — BARD® PTFE FELT, 15.2 CM X 15.2 CM
Type: IMPLANTABLE DEVICE | Site: AORTA | Status: FUNCTIONAL
Brand: BARD® PTFE FELT

## 2020-06-15 RX ORDER — FAMOTIDINE 10 MG/ML
INJECTION, SOLUTION INTRAVENOUS AS NEEDED
Status: DISCONTINUED | OUTPATIENT
Start: 2020-06-15 | End: 2020-06-16 | Stop reason: SURG

## 2020-06-15 RX ORDER — MIDAZOLAM HYDROCHLORIDE 1 MG/ML
INJECTION INTRAMUSCULAR; INTRAVENOUS AS NEEDED
Status: DISCONTINUED | OUTPATIENT
Start: 2020-06-15 | End: 2020-06-16 | Stop reason: SURG

## 2020-06-15 RX ORDER — ADENOSINE 3 MG/ML
INJECTION, SOLUTION INTRAVENOUS
Status: COMPLETED
Start: 2020-06-15 | End: 2020-06-15

## 2020-06-15 RX ORDER — LIDOCAINE HYDROCHLORIDE 10 MG/ML
INJECTION, SOLUTION EPIDURAL; INFILTRATION; INTRACAUDAL; PERINEURAL
Status: COMPLETED
Start: 2020-06-15 | End: 2020-06-15

## 2020-06-15 RX ORDER — VERAPAMIL HYDROCHLORIDE 2.5 MG/ML
INJECTION, SOLUTION INTRAVENOUS
Status: COMPLETED
Start: 2020-06-15 | End: 2020-06-15

## 2020-06-15 RX ORDER — HEPARIN SODIUM 1000 [USP'U]/ML
INJECTION, SOLUTION INTRAVENOUS; SUBCUTANEOUS AS NEEDED
Status: DISCONTINUED | OUTPATIENT
Start: 2020-06-15 | End: 2020-06-16 | Stop reason: SURG

## 2020-06-15 RX ORDER — CEFAZOLIN SODIUM/WATER 2 G/20 ML
SYRINGE (ML) INTRAVENOUS AS NEEDED
Status: DISCONTINUED | OUTPATIENT
Start: 2020-06-15 | End: 2020-06-16 | Stop reason: SURG

## 2020-06-15 RX ORDER — NOREPINEPHRINE BITARTRATE 1 MG/ML
INJECTION, SOLUTION INTRAVENOUS
Status: COMPLETED
Start: 2020-06-15 | End: 2020-06-15

## 2020-06-15 RX ORDER — DEXMEDETOMIDINE HYDROCHLORIDE 4 UG/ML
INJECTION, SOLUTION INTRAVENOUS CONTINUOUS PRN
Status: DISCONTINUED | OUTPATIENT
Start: 2020-06-15 | End: 2020-06-16 | Stop reason: SURG

## 2020-06-15 RX ORDER — DIPHENHYDRAMINE HYDROCHLORIDE 50 MG/ML
INJECTION INTRAMUSCULAR; INTRAVENOUS AS NEEDED
Status: DISCONTINUED | OUTPATIENT
Start: 2020-06-15 | End: 2020-06-16 | Stop reason: SURG

## 2020-06-15 RX ORDER — ROCURONIUM BROMIDE 10 MG/ML
INJECTION, SOLUTION INTRAVENOUS AS NEEDED
Status: DISCONTINUED | OUTPATIENT
Start: 2020-06-15 | End: 2020-06-16 | Stop reason: SURG

## 2020-06-15 RX ORDER — ONDANSETRON 2 MG/ML
INJECTION INTRAMUSCULAR; INTRAVENOUS
Status: COMPLETED
Start: 2020-06-15 | End: 2020-06-15

## 2020-06-15 RX ORDER — MIDAZOLAM HYDROCHLORIDE 1 MG/ML
INJECTION INTRAMUSCULAR; INTRAVENOUS
Status: COMPLETED
Start: 2020-06-15 | End: 2020-06-15

## 2020-06-15 RX ORDER — HEPARIN SODIUM 5000 [USP'U]/ML
INJECTION, SOLUTION INTRAVENOUS; SUBCUTANEOUS
Status: COMPLETED
Start: 2020-06-15 | End: 2020-06-15

## 2020-06-15 RX ORDER — SODIUM CHLORIDE 9 MG/ML
INJECTION, SOLUTION INTRAVENOUS
Status: DISCONTINUED | OUTPATIENT
Start: 2020-06-16 | End: 2020-06-15 | Stop reason: HOSPADM

## 2020-06-15 RX ORDER — LIDOCAINE HYDROCHLORIDE 10 MG/ML
INJECTION, SOLUTION EPIDURAL; INFILTRATION; INTRACAUDAL; PERINEURAL AS NEEDED
Status: DISCONTINUED | OUTPATIENT
Start: 2020-06-15 | End: 2020-06-16 | Stop reason: SURG

## 2020-06-15 RX ADMIN — MIDAZOLAM HYDROCHLORIDE 2 MG: 1 INJECTION INTRAMUSCULAR; INTRAVENOUS at 19:31:00

## 2020-06-15 RX ADMIN — DIPHENHYDRAMINE HYDROCHLORIDE 25 MG: 50 INJECTION INTRAMUSCULAR; INTRAVENOUS at 19:34:00

## 2020-06-15 RX ADMIN — HEPARIN SODIUM 15000 UNITS: 1000 INJECTION, SOLUTION INTRAVENOUS; SUBCUTANEOUS at 21:15:00

## 2020-06-15 RX ADMIN — LIDOCAINE HYDROCHLORIDE 50 MG: 10 INJECTION, SOLUTION EPIDURAL; INFILTRATION; INTRACAUDAL; PERINEURAL at 19:31:00

## 2020-06-15 RX ADMIN — FAMOTIDINE 20 MG: 10 INJECTION, SOLUTION INTRAVENOUS at 19:34:00

## 2020-06-15 RX ADMIN — CEFAZOLIN SODIUM/WATER 2 G: 2 G/20 ML SYRINGE (ML) INTRAVENOUS at 19:47:00

## 2020-06-15 RX ADMIN — DEXMEDETOMIDINE HYDROCHLORIDE 0.4 MCG/KG/HR: 4 INJECTION, SOLUTION INTRAVENOUS at 21:48:00

## 2020-06-15 RX ADMIN — ROCURONIUM BROMIDE 50 MG: 10 INJECTION, SOLUTION INTRAVENOUS at 20:34:00

## 2020-06-15 RX ADMIN — MIDAZOLAM HYDROCHLORIDE 3 MG: 1 INJECTION INTRAMUSCULAR; INTRAVENOUS at 21:36:00

## 2020-06-15 RX ADMIN — ROCURONIUM BROMIDE 50 MG: 10 INJECTION, SOLUTION INTRAVENOUS at 19:31:00

## 2020-06-15 RX ADMIN — HEPARIN SODIUM 5000 UNITS: 1000 INJECTION, SOLUTION INTRAVENOUS; SUBCUTANEOUS at 20:25:00

## 2020-06-15 NOTE — PROCEDURES
[address] Location: Cath Lab    CSN [id number] MRN [id number]   Admission Date 6/15/2020 Procedure Date 6/15/2020   Attending Physician Allison Yeboah MD Procedure Physician Trino Seals MD         CARDIAC CATHETERIZATION REPORT closure; a perclose suture was deployed, with excellent hemostasis. FINDINGS:      1.   Left heart catheterization:    Left ventricle: 164/5 mmHg  Aorta: 136/84/109 mmHg  Left ventriculogram demonstrated a LV ejection fraction of 60% with no mitral visit to the catheterization lab. IMPRESSION:    1. Left heart catheterization: LVEDP 5mmHg, no aortic stenosis. LVEF 60% with normal wall motion, no mitral regurgitation.   2.  Coronary angiography:  right dominant system  - LM: no significant angiog

## 2020-06-15 NOTE — PROGRESS NOTES
Pt s/p LHC with Dr. Rishi Ramirez. Right wrist attempted, failed radial approach, tegaderm dressing CDI,no bleeding or hematoma. +radial pulse. Right groin with perclose, dressing CDI, no bleeding or hematoma, +pedal pulse. Pt recovered in holding.  Dr. Rishi Ramirez came and

## 2020-06-15 NOTE — H&P
[facility]  History and Physical      Osker Coma Patient Status:  Outpatient in a Bed    1960 MRN [id number]   Location [facility] Attending Joi Dubon MD   Hosp Day # 0 MARTHA Manrique DO DISLOCATION   • RIGHT PHACOEMULSIFICATION OF CATARACT WITH INTRAOCULAR LENS IMPLANT [id number] Right 12/11/2019    Performed by Jason Adam MD at [facility]       Family History  family history includes Diabetes in her brother, brother, and mo bilaterally; no accessory muscle use is noted  Abdomen: Soft, non-tender; bowel sounds are normoactive; no hepatosplenomegaly  Extremities: No clubbing or cyanosis; moves all 4 extremities normally  Psychiatric: Normal mood and affect; answers questions ap

## 2020-06-16 ENCOUNTER — APPOINTMENT (OUTPATIENT)
Dept: GENERAL RADIOLOGY | Facility: HOSPITAL | Age: 60
DRG: 003 | End: 2020-06-16
Attending: INTERNAL MEDICINE
Payer: COMMERCIAL

## 2020-06-16 ENCOUNTER — ANESTHESIA (OUTPATIENT)
Dept: CARDIAC SURGERY | Facility: HOSPITAL | Age: 60
DRG: 003 | End: 2020-06-16
Payer: COMMERCIAL

## 2020-06-16 ENCOUNTER — ANESTHESIA EVENT (OUTPATIENT)
Dept: CARDIAC SURGERY | Facility: HOSPITAL | Age: 60
DRG: 003 | End: 2020-06-16
Payer: COMMERCIAL

## 2020-06-16 ENCOUNTER — APPOINTMENT (OUTPATIENT)
Dept: GENERAL RADIOLOGY | Facility: HOSPITAL | Age: 60
DRG: 003 | End: 2020-06-16
Attending: THORACIC SURGERY (CARDIOTHORACIC VASCULAR SURGERY)
Payer: COMMERCIAL

## 2020-06-16 ENCOUNTER — APPOINTMENT (OUTPATIENT)
Dept: CV DIAGNOSTICS | Facility: HOSPITAL | Age: 60
DRG: 003 | End: 2020-06-16
Attending: INTERNAL MEDICINE
Payer: COMMERCIAL

## 2020-06-16 PROBLEM — I25.10 CAD (CORONARY ARTERY DISEASE): Status: ACTIVE | Noted: 2020-06-16

## 2020-06-16 PROCEDURE — 05HY33Z INSERTION OF INFUSION DEVICE INTO UPPER VEIN, PERCUTANEOUS APPROACH: ICD-10-PCS | Performed by: THORACIC SURGERY (CARDIOTHORACIC VASCULAR SURGERY)

## 2020-06-16 PROCEDURE — 71045 X-RAY EXAM CHEST 1 VIEW: CPT | Performed by: INTERNAL MEDICINE

## 2020-06-16 PROCEDURE — 04PY03Z REMOVAL OF INFUSION DEVICE FROM LOWER ARTERY, OPEN APPROACH: ICD-10-PCS | Performed by: THORACIC SURGERY (CARDIOTHORACIC VASCULAR SURGERY)

## 2020-06-16 PROCEDURE — 99255 IP/OBS CONSLTJ NEW/EST HI 80: CPT | Performed by: INTERNAL MEDICINE

## 2020-06-16 PROCEDURE — 36430 TRANSFUSION BLD/BLD COMPNT: CPT | Performed by: ANESTHESIOLOGY

## 2020-06-16 PROCEDURE — 93306 TTE W/DOPPLER COMPLETE: CPT | Performed by: INTERNAL MEDICINE

## 2020-06-16 PROCEDURE — 77001 FLUOROGUIDE FOR VEIN DEVICE: CPT | Performed by: THORACIC SURGERY (CARDIOTHORACIC VASCULAR SURGERY)

## 2020-06-16 PROCEDURE — 30233M1 TRANSFUSION OF NONAUTOLOGOUS PLASMA CRYOPRECIPITATE INTO PERIPHERAL VEIN, PERCUTANEOUS APPROACH: ICD-10-PCS | Performed by: INTERNAL MEDICINE

## 2020-06-16 PROCEDURE — P9045 ALBUMIN (HUMAN), 5%, 250 ML: HCPCS | Performed by: INTERNAL MEDICINE

## 2020-06-16 PROCEDURE — 04QK0ZZ REPAIR RIGHT FEMORAL ARTERY, OPEN APPROACH: ICD-10-PCS | Performed by: THORACIC SURGERY (CARDIOTHORACIC VASCULAR SURGERY)

## 2020-06-16 PROCEDURE — 71045 X-RAY EXAM CHEST 1 VIEW: CPT | Performed by: THORACIC SURGERY (CARDIOTHORACIC VASCULAR SURGERY)

## 2020-06-16 PROCEDURE — 76000 FLUOROSCOPY <1 HR PHYS/QHP: CPT | Performed by: THORACIC SURGERY (CARDIOTHORACIC VASCULAR SURGERY)

## 2020-06-16 PROCEDURE — 0WJC0ZZ INSPECTION OF MEDIASTINUM, OPEN APPROACH: ICD-10-PCS | Performed by: THORACIC SURGERY (CARDIOTHORACIC VASCULAR SURGERY)

## 2020-06-16 DEVICE — IMPLANTABLE DEVICE: Type: IMPLANTABLE DEVICE | Site: CHEST | Status: FUNCTIONAL

## 2020-06-16 RX ORDER — NITROGLYCERIN 20 MG/100ML
INJECTION INTRAVENOUS CONTINUOUS PRN
Status: DISCONTINUED | OUTPATIENT
Start: 2020-06-16 | End: 2020-06-17

## 2020-06-16 RX ORDER — DOBUTAMINE HYDROCHLORIDE 200 MG/100ML
INJECTION INTRAVENOUS CONTINUOUS PRN
Status: DISCONTINUED | OUTPATIENT
Start: 2020-06-16 | End: 2020-06-16 | Stop reason: SURG

## 2020-06-16 RX ORDER — FAMOTIDINE 10 MG/ML
20 INJECTION, SOLUTION INTRAVENOUS 2 TIMES DAILY
Status: DISCONTINUED | OUTPATIENT
Start: 2020-06-16 | End: 2020-06-17

## 2020-06-16 RX ORDER — DEXTROSE AND POTASSIUM CHLORIDE 5; .15 G/100ML; G/100ML
SOLUTION INTRAVENOUS CONTINUOUS
Status: DISCONTINUED | OUTPATIENT
Start: 2020-06-16 | End: 2020-06-17

## 2020-06-16 RX ORDER — MORPHINE SULFATE 4 MG/ML
6 INJECTION, SOLUTION INTRAMUSCULAR; INTRAVENOUS EVERY 2 HOUR PRN
Status: DISCONTINUED | OUTPATIENT
Start: 2020-06-16 | End: 2020-06-17

## 2020-06-16 RX ORDER — FAMOTIDINE 20 MG/1
20 TABLET ORAL 2 TIMES DAILY
Status: DISCONTINUED | OUTPATIENT
Start: 2020-06-16 | End: 2020-06-17

## 2020-06-16 RX ORDER — ALBUMIN, HUMAN INJ 5% 5 %
SOLUTION INTRAVENOUS CONTINUOUS PRN
Status: DISCONTINUED | OUTPATIENT
Start: 2020-06-16 | End: 2020-06-16 | Stop reason: SURG

## 2020-06-16 RX ORDER — ROCURONIUM BROMIDE 10 MG/ML
INJECTION, SOLUTION INTRAVENOUS AS NEEDED
Status: DISCONTINUED | OUTPATIENT
Start: 2020-06-16 | End: 2020-06-16 | Stop reason: SURG

## 2020-06-16 RX ORDER — POTASSIUM CHLORIDE 29.8 MG/ML
40 INJECTION INTRAVENOUS ONCE
Status: COMPLETED | OUTPATIENT
Start: 2020-06-16 | End: 2020-06-16

## 2020-06-16 RX ORDER — HYDROCODONE BITARTRATE AND ACETAMINOPHEN 10; 325 MG/1; MG/1
1 TABLET ORAL EVERY 4 HOURS PRN
Status: DISCONTINUED | OUTPATIENT
Start: 2020-06-16 | End: 2020-06-17

## 2020-06-16 RX ORDER — MAGNESIUM SULFATE HEPTAHYDRATE 40 MG/ML
2 INJECTION, SOLUTION INTRAVENOUS AS NEEDED
Status: DISCONTINUED | OUTPATIENT
Start: 2020-06-16 | End: 2020-06-17

## 2020-06-16 RX ORDER — MAGNESIUM SULFATE 1 G/100ML
1 INJECTION INTRAVENOUS AS NEEDED
Status: DISCONTINUED | OUTPATIENT
Start: 2020-06-16 | End: 2020-06-17

## 2020-06-16 RX ORDER — MORPHINE SULFATE 4 MG/ML
2 INJECTION, SOLUTION INTRAMUSCULAR; INTRAVENOUS EVERY 2 HOUR PRN
Status: DISCONTINUED | OUTPATIENT
Start: 2020-06-16 | End: 2020-06-17

## 2020-06-16 RX ORDER — CEFAZOLIN SODIUM 1 G/3ML
INJECTION, POWDER, FOR SOLUTION INTRAMUSCULAR; INTRAVENOUS AS NEEDED
Status: DISCONTINUED | OUTPATIENT
Start: 2020-06-16 | End: 2020-06-16 | Stop reason: SURG

## 2020-06-16 RX ORDER — CEFAZOLIN SODIUM/WATER 2 G/20 ML
SYRINGE (ML) INTRAVENOUS AS NEEDED
Status: DISCONTINUED | OUTPATIENT
Start: 2020-06-16 | End: 2020-06-16 | Stop reason: SURG

## 2020-06-16 RX ORDER — POTASSIUM CHLORIDE 29.8 MG/ML
40 INJECTION INTRAVENOUS ONCE
Status: COMPLETED | OUTPATIENT
Start: 2020-06-16 | End: 2020-06-17

## 2020-06-16 RX ORDER — MIDAZOLAM HYDROCHLORIDE 1 MG/ML
INJECTION INTRAMUSCULAR; INTRAVENOUS AS NEEDED
Status: DISCONTINUED | OUTPATIENT
Start: 2020-06-16 | End: 2020-06-16 | Stop reason: SURG

## 2020-06-16 RX ORDER — HEPARIN SODIUM AND DEXTROSE 10000; 5 [USP'U]/100ML; G/100ML
500 INJECTION INTRAVENOUS CONTINUOUS
Status: DISCONTINUED | OUTPATIENT
Start: 2020-06-16 | End: 2020-06-17

## 2020-06-16 RX ORDER — POTASSIUM CHLORIDE 14.9 MG/ML
20 INJECTION INTRAVENOUS AS NEEDED
Status: DISCONTINUED | OUTPATIENT
Start: 2020-06-16 | End: 2020-06-17

## 2020-06-16 RX ORDER — MAGNESIUM OXIDE 400 MG (241.3 MG MAGNESIUM) TABLET
3 TABLET NIGHTLY PRN
Status: DISCONTINUED | OUTPATIENT
Start: 2020-06-16 | End: 2020-06-17

## 2020-06-16 RX ORDER — PROTAMINE SULFATE 10 MG/ML
INJECTION, SOLUTION INTRAVENOUS AS NEEDED
Status: DISCONTINUED | OUTPATIENT
Start: 2020-06-16 | End: 2020-06-16 | Stop reason: SURG

## 2020-06-16 RX ORDER — VANCOMYCIN HYDROCHLORIDE
20 EVERY 12 HOURS
Status: DISCONTINUED | OUTPATIENT
Start: 2020-06-16 | End: 2020-06-16 | Stop reason: SDUPTHER

## 2020-06-16 RX ORDER — DEXTROSE MONOHYDRATE 50 MG/ML
INJECTION, SOLUTION INTRAVENOUS CONTINUOUS
Status: DISCONTINUED | OUTPATIENT
Start: 2020-06-16 | End: 2020-06-16

## 2020-06-16 RX ORDER — MORPHINE SULFATE 4 MG/ML
4 INJECTION, SOLUTION INTRAMUSCULAR; INTRAVENOUS EVERY 2 HOUR PRN
Status: DISCONTINUED | OUTPATIENT
Start: 2020-06-16 | End: 2020-06-17

## 2020-06-16 RX ORDER — DOBUTAMINE HYDROCHLORIDE 200 MG/100ML
INJECTION INTRAVENOUS CONTINUOUS
Status: DISCONTINUED | OUTPATIENT
Start: 2020-06-16 | End: 2020-06-17

## 2020-06-16 RX ORDER — FUROSEMIDE 10 MG/ML
INJECTION INTRAMUSCULAR; INTRAVENOUS AS NEEDED
Status: DISCONTINUED | OUTPATIENT
Start: 2020-06-16 | End: 2020-06-16 | Stop reason: SURG

## 2020-06-16 RX ORDER — DEXMEDETOMIDINE HYDROCHLORIDE 4 UG/ML
INJECTION, SOLUTION INTRAVENOUS CONTINUOUS
Status: DISCONTINUED | OUTPATIENT
Start: 2020-06-16 | End: 2020-06-16 | Stop reason: SDUPTHER

## 2020-06-16 RX ORDER — SODIUM CHLORIDE 9 MG/ML
INJECTION, SOLUTION INTRAVENOUS CONTINUOUS PRN
Status: DISCONTINUED | OUTPATIENT
Start: 2020-06-16 | End: 2020-06-16 | Stop reason: SURG

## 2020-06-16 RX ORDER — ALBUMIN, HUMAN INJ 5% 5 %
250 SOLUTION INTRAVENOUS ONCE
Status: DISCONTINUED | OUTPATIENT
Start: 2020-06-16 | End: 2020-06-17

## 2020-06-16 RX ORDER — LEVOTHYROXINE SODIUM 0.05 MG/1
50 TABLET ORAL
Status: DISCONTINUED | OUTPATIENT
Start: 2020-06-17 | End: 2020-06-17

## 2020-06-16 RX ORDER — CEFAZOLIN SODIUM/WATER 2 G/20 ML
2 SYRINGE (ML) INTRAVENOUS EVERY 8 HOURS
Status: DISCONTINUED | OUTPATIENT
Start: 2020-06-16 | End: 2020-06-17

## 2020-06-16 RX ORDER — DEXMEDETOMIDINE HYDROCHLORIDE 4 UG/ML
INJECTION, SOLUTION INTRAVENOUS CONTINUOUS
Status: DISCONTINUED | OUTPATIENT
Start: 2020-06-16 | End: 2020-06-17

## 2020-06-16 RX ORDER — HYDROCODONE BITARTRATE AND ACETAMINOPHEN 10; 325 MG/1; MG/1
2 TABLET ORAL EVERY 4 HOURS PRN
Status: DISCONTINUED | OUTPATIENT
Start: 2020-06-16 | End: 2020-06-17

## 2020-06-16 RX ORDER — BUMETANIDE 0.25 MG/ML
2 INJECTION, SOLUTION INTRAMUSCULAR; INTRAVENOUS ONCE
Status: COMPLETED | OUTPATIENT
Start: 2020-06-16 | End: 2020-06-16

## 2020-06-16 RX ORDER — HEPARIN SODIUM AND DEXTROSE 10000; 5 [USP'U]/100ML; G/100ML
500 INJECTION INTRAVENOUS CONTINUOUS PRN
Status: DISCONTINUED | OUTPATIENT
Start: 2020-06-16 | End: 2020-06-16

## 2020-06-16 RX ORDER — DOBUTAMINE HYDROCHLORIDE 100 MG/100ML
INJECTION INTRAVENOUS CONTINUOUS PRN
Status: DISCONTINUED | OUTPATIENT
Start: 2020-06-16 | End: 2020-06-16 | Stop reason: SURG

## 2020-06-16 RX ORDER — CHLORHEXIDINE GLUCONATE 0.12 MG/ML
15 RINSE ORAL 2 TIMES DAILY
Status: DISCONTINUED | OUTPATIENT
Start: 2020-06-16 | End: 2020-06-17

## 2020-06-16 RX ORDER — ONDANSETRON 2 MG/ML
4 INJECTION INTRAMUSCULAR; INTRAVENOUS EVERY 6 HOURS PRN
Status: DISCONTINUED | OUTPATIENT
Start: 2020-06-16 | End: 2020-06-17

## 2020-06-16 RX ORDER — HEPARIN SODIUM 10000 [USP'U]/100ML
INJECTION, SOLUTION INTRAVENOUS
Status: DISPENSED
Start: 2020-06-16 | End: 2020-06-17

## 2020-06-16 RX ORDER — SODIUM CHLORIDE 9 MG/ML
INJECTION, SOLUTION INTRAVENOUS ONCE
Status: COMPLETED | OUTPATIENT
Start: 2020-06-16 | End: 2020-06-16

## 2020-06-16 RX ORDER — ALBUMIN, HUMAN INJ 5% 5 %
250 SOLUTION INTRAVENOUS ONCE AS NEEDED
Status: COMPLETED | OUTPATIENT
Start: 2020-06-16 | End: 2020-06-16

## 2020-06-16 RX ORDER — POTASSIUM CHLORIDE 29.8 MG/ML
40 INJECTION INTRAVENOUS AS NEEDED
Status: DISCONTINUED | OUTPATIENT
Start: 2020-06-16 | End: 2020-06-17

## 2020-06-16 RX ORDER — ALBUMIN, HUMAN INJ 5% 5 %
250 SOLUTION INTRAVENOUS ONCE
Status: COMPLETED | OUTPATIENT
Start: 2020-06-16 | End: 2020-06-16

## 2020-06-16 RX ORDER — ALBUTEROL SULFATE 90 UG/1
AEROSOL, METERED RESPIRATORY (INHALATION) AS NEEDED
Status: DISCONTINUED | OUTPATIENT
Start: 2020-06-16 | End: 2020-06-16 | Stop reason: SURG

## 2020-06-16 RX ORDER — SODIUM CHLORIDE 9 MG/ML
INJECTION, SOLUTION INTRAVENOUS CONTINUOUS
Status: DISCONTINUED | OUTPATIENT
Start: 2020-06-16 | End: 2020-06-17

## 2020-06-16 RX ORDER — CALCIUM CHLORIDE 100 MG/ML
INJECTION INTRAVENOUS; INTRAVENTRICULAR AS NEEDED
Status: DISCONTINUED | OUTPATIENT
Start: 2020-06-16 | End: 2020-06-16 | Stop reason: SURG

## 2020-06-16 RX ADMIN — CEFAZOLIN SODIUM/WATER 2 G: 2 G/20 ML SYRINGE (ML) INTRAVENOUS at 14:45:00

## 2020-06-16 RX ADMIN — ROCURONIUM BROMIDE 50 MG: 10 INJECTION, SOLUTION INTRAVENOUS at 00:16:00

## 2020-06-16 RX ADMIN — POTASSIUM CHLORIDE 40 MEQ: 29.8 INJECTION INTRAVENOUS at 07:16:00

## 2020-06-16 RX ADMIN — ROCURONIUM BROMIDE 100 MG: 10 INJECTION, SOLUTION INTRAVENOUS at 14:26:00

## 2020-06-16 RX ADMIN — PROTAMINE SULFATE 10 MG: 10 INJECTION, SOLUTION INTRAVENOUS at 01:05:00

## 2020-06-16 RX ADMIN — Medication 50 ML: at 09:55:00

## 2020-06-16 RX ADMIN — ALBUMIN, HUMAN INJ 5%: 5 SOLUTION INTRAVENOUS at 03:36:00

## 2020-06-16 RX ADMIN — POTASSIUM CHLORIDE 40 MEQ: 29.8 INJECTION INTRAVENOUS at 20:12:00

## 2020-06-16 RX ADMIN — Medication 50 ML: at 15:00:00

## 2020-06-16 RX ADMIN — DEXMEDETOMIDINE HYDROCHLORIDE 0.4 MCG/KG/HR: 4 INJECTION, SOLUTION INTRAVENOUS at 00:16:00

## 2020-06-16 RX ADMIN — ALBUMIN, HUMAN INJ 5% 250 ML: 5 SOLUTION INTRAVENOUS at 13:30:00

## 2020-06-16 RX ADMIN — MIDAZOLAM HYDROCHLORIDE 2 MG: 1 INJECTION INTRAMUSCULAR; INTRAVENOUS at 03:40:00

## 2020-06-16 RX ADMIN — CEFAZOLIN SODIUM/WATER 2 G: 2 G/20 ML SYRINGE (ML) INTRAVENOUS at 22:20:00

## 2020-06-16 RX ADMIN — PROTAMINE SULFATE 240 MG: 10 INJECTION, SOLUTION INTRAVENOUS at 01:06:00

## 2020-06-16 RX ADMIN — CEFAZOLIN SODIUM/WATER 2 G: 2 G/20 ML SYRINGE (ML) INTRAVENOUS at 04:14:00

## 2020-06-16 RX ADMIN — ALBUTEROL SULFATE 5 PUFF: 90 AEROSOL, METERED RESPIRATORY (INHALATION) at 00:45:00

## 2020-06-16 RX ADMIN — SODIUM CHLORIDE: 9 INJECTION, SOLUTION INTRAVENOUS at 11:45:00

## 2020-06-16 RX ADMIN — DOBUTAMINE HYDROCHLORIDE 5 MCG/KG/MIN: 200 INJECTION INTRAVENOUS at 05:45:00

## 2020-06-16 RX ADMIN — MIDAZOLAM HYDROCHLORIDE 2 MG: 1 INJECTION INTRAMUSCULAR; INTRAVENOUS at 08:20:00

## 2020-06-16 RX ADMIN — CALCIUM CHLORIDE 1 G: 100 INJECTION INTRAVENOUS; INTRAVENTRICULAR at 16:10:00

## 2020-06-16 RX ADMIN — MIDAZOLAM HYDROCHLORIDE 5 MG: 1 INJECTION INTRAMUSCULAR; INTRAVENOUS at 14:55:00

## 2020-06-16 RX ADMIN — SODIUM CHLORIDE: 9 INJECTION, SOLUTION INTRAVENOUS at 06:45:00

## 2020-06-16 RX ADMIN — Medication 50 ML: at 09:42:00

## 2020-06-16 RX ADMIN — FAMOTIDINE 20 MG: 10 INJECTION, SOLUTION INTRAVENOUS at 20:13:00

## 2020-06-16 RX ADMIN — DOBUTAMINE HYDROCHLORIDE 5 MCG/KG/MIN: 200 INJECTION INTRAVENOUS at 00:40:00

## 2020-06-16 RX ADMIN — MIDAZOLAM HYDROCHLORIDE 2 MG: 1 INJECTION INTRAMUSCULAR; INTRAVENOUS at 00:35:00

## 2020-06-16 RX ADMIN — POTASSIUM CHLORIDE 40 MEQ: 29.8 INJECTION INTRAVENOUS at 11:15:00

## 2020-06-16 RX ADMIN — DEXTROSE AND POTASSIUM CHLORIDE: 5; .15 SOLUTION INTRAVENOUS at 19:45:00

## 2020-06-16 RX ADMIN — ROCURONIUM BROMIDE 50 MG: 10 INJECTION, SOLUTION INTRAVENOUS at 08:43:00

## 2020-06-16 RX ADMIN — BUMETANIDE 2 MG: 0.25 INJECTION, SOLUTION INTRAMUSCULAR; INTRAVENOUS at 18:45:00

## 2020-06-16 RX ADMIN — CEFAZOLIN SODIUM/WATER 2 G: 2 G/20 ML SYRINGE (ML) INTRAVENOUS at 08:43:00

## 2020-06-16 RX ADMIN — CEFAZOLIN SODIUM 2 G: 1 INJECTION, POWDER, FOR SOLUTION INTRAMUSCULAR; INTRAVENOUS at 00:12:00

## 2020-06-16 RX ADMIN — MIDAZOLAM HYDROCHLORIDE 3 MG: 1 INJECTION INTRAMUSCULAR; INTRAVENOUS at 00:16:00

## 2020-06-16 RX ADMIN — ROCURONIUM BROMIDE 50 MG: 10 INJECTION, SOLUTION INTRAVENOUS at 09:43:00

## 2020-06-16 RX ADMIN — Medication 50 ML: at 04:10:00

## 2020-06-16 RX ADMIN — HEPARIN SODIUM AND DEXTROSE 500 UNITS/HR: 10000; 5 INJECTION INTRAVENOUS at 21:35:00

## 2020-06-16 RX ADMIN — DEXTROSE MONOHYDRATE: 50 INJECTION, SOLUTION INTRAVENOUS at 17:31:00

## 2020-06-16 RX ADMIN — DOBUTAMINE HYDROCHLORIDE 5 MCG/KG/MIN: 200 INJECTION INTRAVENOUS at 08:15:00

## 2020-06-16 RX ADMIN — SODIUM CHLORIDE: 9 INJECTION, SOLUTION INTRAVENOUS at 10:29:00

## 2020-06-16 RX ADMIN — ALBUMIN, HUMAN INJ 5% 250 ML: 5 SOLUTION INTRAVENOUS at 07:49:00

## 2020-06-16 RX ADMIN — FUROSEMIDE 40 MG: 10 INJECTION INTRAMUSCULAR; INTRAVENOUS at 15:21:00

## 2020-06-16 RX ADMIN — HEPARIN SODIUM 7500 UNITS: 1000 INJECTION, SOLUTION INTRAVENOUS; SUBCUTANEOUS at 02:53:00

## 2020-06-16 RX ADMIN — SODIUM CHLORIDE: 9 INJECTION, SOLUTION INTRAVENOUS at 09:47:00

## 2020-06-16 RX ADMIN — SODIUM CHLORIDE: 9 INJECTION, SOLUTION INTRAVENOUS at 08:15:00

## 2020-06-16 RX ADMIN — CHLORHEXIDINE GLUCONATE 15 ML: 0.12 RINSE ORAL at 21:26:00

## 2020-06-16 RX ADMIN — DOBUTAMINE HYDROCHLORIDE 5 MCG/KG/MIN: 100 INJECTION INTRAVENOUS at 13:55:00

## 2020-06-16 NOTE — ANESTHESIA PREPROCEDURE EVALUATION
PRE-OP EVALUATION    Patient Name: Mary Hodge    Pre-op Diagnosis: S/P CABG, ASCENDING, ECMO, post surgical bleeding    Procedure(s):  Exploration of bleeding       Surgeon(s) and Role:     * Shannon Hagen MD - Primary    Pre-op vitals reviewed.   Temp: 5 % 250 mL infusion, 0.1-4 mcg/kg/min, Intravenous, Continuous PRN  [MAR Hold] ondansetron HCl (ZOFRAN) injection 4 mg, 4 mg, Intravenous, Q6H PRN  [MAR Hold] famoTIDine (PEPCID) tab 20 mg, 20 mg, Oral, BID    Or  [MAR Hold] famoTIDine (PEPCID) injection 2 [COMPLETED] Heparin Sodium (Porcine) 5000 UNIT/ML injection, , ,   [COMPLETED] ondansetron HCl (ZOFRAN) 4 MG/2ML injection, , ,   [COMPLETED] lidocaine (PF) (XYLOCAINE) 1 % injection, , ,   [COMPLETED] Heparin Sodium (Porcine) 5000 UNIT/ML injection, , , Disp: 12 tablet, Rfl: 3  Calcium-Magnesium-Vitamin D (CALCIUM 1200+D3 OR), Take 1 capsule by mouth daily. , Disp: , Rfl:         Allergies: Lisinopril      Anesthesia Evaluation    Patient summary reviewed.     Anesthetic Complications  (-) history of anesth CATARACT WITH INTRAOCULAR LENS IMPLANT [id number] Right 12/11/2019    Performed by Justice Rubinstein, MD at [address] [place] [address]     Social History    Tobacco Use      Smoking status: Never Smoker      Smokeless tobacco: Never Used    Alcohol use: No      [place]

## 2020-06-16 NOTE — PLAN OF CARE
Received patient from [place] [id number] at [phone]. Re-op for suspicion of bleeding due to sustained hypovolemia and increased pressor support. 100 ml EBL. Chest tubes hooked up to suction. Sedated via Propofol. Nitric at 40 ppm, lungs diminished.  Pupils sluggish, yet andi

## 2020-06-16 NOTE — OCCUPATIONAL THERAPY NOTE
Occupational therapy consult requested s/p CABG x2 vessels. Chart reviewed and noted pt requiring return to OR x2 emergently due to bleeding. Will sign off at this time. Please re-consult therapy as needed.

## 2020-06-16 NOTE — ANESTHESIA POSTPROCEDURE EVALUATION
[address] Patient Status:  Inpatient   Age/Gender 64year old female MRN [id number]   [facility] 6NE-A Attending Luann Fairchild MD   [facility] Day # 0 PCP Erasmo Aguilera DO       Anesthesia Post-op Note    Procedure(s):  Emerg

## 2020-06-16 NOTE — PROCEDURES
[address] Location: Cath Lab    CSN [id number] MRN [id number]   Admission Date 6/15/2020 Procedure Date 6/15/2020   Attending Physician Evie Bell MD Procedure Physician Cindy Fair MD         CARDIAC CATHETERIZATION REPORT this point in time, an emergent CV surgery consult was called and the case was discussed with CV surgeon, Dr. Shawn Hale.    Per his request, and with the assistance of Dr. Roman Lozano, we accompanied the patient for an emergent CTA of the aorta to better asse

## 2020-06-16 NOTE — PROGRESS NOTES
[facility]  Consultation Note      Ajit Verónica Patient Status:  Inpatient    1960 MRN [id number]   [facility] 6NE-A Attending Tiara Thomson MD   Hosp Day # 0 PCP Kurt Tobar DO     Outpatient cardiologist:  Shahriar Ayers was emergently brought back to the cath lab for re-look angiography in the setting of chest pain, hypotension/bradycardia, and inferior ST elevation. The proximal RCA was acutely occluded, presumed due to vessel dissection.   Attempt was made to revasculari Continuous  HYDROcodone-acetaminophen (NORCO)  MG per tab 1 tablet, 1 tablet, Oral, Q4H PRN    Or  HYDROcodone-acetaminophen (NORCO)  MG per tab 2 tablet, 2 tablet, Oral, Q4H PRN  morphINE sulfate (PF) 4 MG/ML injection 2 mg, 2 mg, Intravenous, Continuous        Past Medical History:   Diagnosis Date   • DIABETES    • Essential hypertension    • High blood pressure    • High cholesterol    • HYPERLIPIDEMIA    • HYPERTENSION    • Type II or unspecified type diabetes mellitus without mention of com negative for dizziness and headaches  Endocrine: negative for temperature intolerance      Physical Exam:  Blood pressure 126/73, pulse 67, temperature 98.6 °F (37 °C), temperature source Pulmonary Artery, resp.  rate 12, height 4' 11\" (1.499 m), weight 15

## 2020-06-16 NOTE — PAYOR COMM NOTE
--------------  ADMISSION REVIEW     Payor: [address]  Subscriber #:  [id number]  Authorization Number: N/A    Admit date: 6/16/20  Admit time: 9560       Admitting Physician: Columba Bagley MD  Attending Physician:  Columba Bagley MD  [facility] Call      Allergies    Lisinopril              Coughing      Review of Systems:  Constitutional: negative for fevers  Eyes: negative for visual disturbance  Ears, nose, mouth, throat, and face: negative for epistaxis  Respiratory: negative for dyspnea on e DAY:  Albumin Human (ALBUMINAR) 5 % solution     Date Action Dose Route User    6/16/2020 0336 New Bag (none) Intravenous Brain Leal MD      Albumin Human [facility]) 5 % solution 250 mL     Date Action Dose Route User    6/16/2020 0749 New Bag 250 mL (Order-Specific) Intravenous Tom Figueroa MD      diphenhydrAMINE HCl (BENADRYL) injection     Date Action Dose Route User    6/15/2020 1934 Given 25 mg Intravenous Tom Figueroa MD      DOBUTamine in D5W (DOBUTREX) 500 mg/250 ml infusion     Date Act 250 mL infusion     Date Action Dose Route User    6/16/2020 1200 Rate/Dose Verify 3 mcg/min Intravenous Vasyl Roberto, RN    6/16/2020 0815 Continued by Anesthesia 3 mcg/min Intravenous Rui Jones MD      famoTIDine (PEPCID) injection     Date Action New Bag 2 Units/hr Intravenous Tom Figueroa MD      Insulin Regular Human (NOVOLIN R) 100 Units in sodium chloride 0.9% 100 mL infusion     Date Action Dose Route User    6/16/2020 1155 New Bag 41 Units/hr Intravenous Urszula Oliver RN    6/16/2020 456 Rate/Dose Change 2 mcg/min Intravenous Chip Cazares MD    6/16/2020 0109 Restarted 1 mcg/min Intravenous Chip Cazares MD    6/15/2020 2058 Rate/Dose Change 6 mcg/min Intravenous Chip Cazares MD    6/15/2020 1935 New Bag 4 mcg/min Intravenous Merly 58.5 kg Intravenous Kevin Marino RN    6/16/2020 0807 New Bag 20 mcg/kg/min × 58.5 kg Intravenous Justin Ward RN    6/16/2020 0545 Restarted 20 mcg/kg/min × 58.5 kg Intravenous Russell Sen RN      propofol (DIPRIVAN) infusion     Date Action Dose R - MRN: [id number] - Age: 64year old - :  1960     Date of Admission: 6/15/2020 12:45 PM  Admission Diagnosis: Elevated coronary artery calcium score [R93.1]  Reason for Consult: acute resp failure        History of Present Illness: 63 y/o w/ h/o DM, Potassium 50 MG Oral Tab, Take 1 tablet (50 mg total) by mouth once daily. , Disp: 90 tablet, Rfl: 0, 6/14/2020 at pm  LEVOTHYROXINE SODIUM 50 MCG Oral Tab, TAKE 1 TABLET BY MOUTH EVERY DAY, Disp: 90 tablet, Rfl: 1, 6/14/2020 at am  cholecalciferol (VITAMIN Minutes per session: Not on file      Stress: Not on file    Relationships      Social connections:        Talks on phone: Not on file        Gets together: Not on file        Attends Church service: Not on file        Active member of club or [employer] tablet by mouth daily as needed. , Disp: 30 tablet, Rfl: 0  AMLODIPINE BESYLATE 10 MG Oral Tab, TAKE 1 TABLET BY MOUTH EVERY DAY, Disp: 90 tablet, Rfl: 0  aspirin 81 MG Oral Tab, Take 1 tablet (81 mg total) by mouth daily. , Disp: , Rfl: 0  alendronate 70 MG 100 %  O2 Device: Ventilator  FiO2 (%): 100 %  Pulse Oximetry Type: Continuous  Oximetry Probe Site Changed: No              Ventilator Settings: PRVC 12// FIO2 100/PEEP 5.   FiO2 (%): 100 %           Wt Readings from Last 3 Encounters:  06/16/20 : 15 Cards/CTS  3. STEMI/Type A dissection  -s/p cath and surgical repair  -cards/CTS following  4. Opacified left hemithorax  -suspect blood/bleeding in the chest  -pt to return to OR  5. Hypernatremia  -change IVF to D5/45 vs D5W  -will discuss w/ endo  6.  AK

## 2020-06-16 NOTE — ANESTHESIA POSTPROCEDURE EVALUATION
[address] 89 Patient Status:  Outpatient in a Bed   Age/Gender 64year old female MRN [id number]   Location [address] [place] [address] Attending Allison Yeboah MD   Hosp Day # 0 PCP Kendall Brunson DO       Anesthesia Post-op N

## 2020-06-16 NOTE — ANESTHESIA PREPROCEDURE EVALUATION
PRE-OP EVALUATION    Patient Name: Soraya Sharp    Pre-op Diagnosis: dissection    Procedure(s):      Surgeon(s) and Role:     Renny Dolan MD - Primary    Pre-op vitals reviewed.   Temp: 98.4 °F (36.9 °C)  Pulse: 67  Resp: 17  BP: 108/72  SpO2: 100 %  Bod sodium chloride 0.9% 100 mL infusion, 1-40 Units/hr, Intravenous, PRN  heparin sodium 1000 UNIT/ML injection, , Intravenous, PRN  Midazolam HCl (VERSED) 2 MG/2ML injection, , , PRN  fentaNYL citrate (SUBLIMAZE) 0.05 MG/ML injection, , Intravenous, PRN  pro Evaluation    Patient summary reviewed.     Anesthetic Complications           GI/Hepatic/Renal      (+) GERD          (+) liver disease                 Cardiovascular                  (+) hypertension     (+) CAD        (+) valvular problems/murmurs and AI abnormal  (+) murmur   Dental  Comment: No loose teeth  No notable dental history.          Pulmonary    Pulmonary exam normal.                 Other findings            ASA: 5 and emergent  Plan: general  NPO status verified and     Post-procedure pain man

## 2020-06-16 NOTE — ANESTHESIA PROCEDURE NOTES
Airway  Date/Time: 6/15/2020 7:30 PM  Urgency: elective    Airway not difficult    General Information and Staff    Patient location during procedure: OR  Anesthesiologist: Julian Gonzales MD  Performed: anesthesiologist     Indications and Patient Conditi

## 2020-06-16 NOTE — ANESTHESIA PROCEDURE NOTES
Arterial Line  Performed by: Sandy Horowitz MD  Authorized by: Sandy Horowitz MD     General Information and Staff    Procedure Start:  6/15/2020 7:15 PM  Procedure End:  6/15/2020 7:20 PM  Anesthesiologist:  Sandy Horowitz MD  Performed By:  Nathanael Hopkins

## 2020-06-16 NOTE — CONSULTS
[facility]    PATIENT'S NAME: Shameka Altman   ATTENDING PHYSICIAN: Eligio Jimenez M.D.   CONSULTING PHYSICIAN: Micah Gregorio M.D.    PATIENT ACCOUNT#:   [de-identified]    LOCATION:  [address] [place] [address]  MEDICAL RECORD #:   [id number]       DATE OF BIRTH:  [birth year]  Heart:  ST, diastolic aortic murmur. Abd:  Soft, ND, NT  Ext:  Palpable pedal pulses. No C, C, E  Neuro:  No focal deficit. Impression:  Acute inferior wall ischemia w/ RCA dissection, s/p stents placement. Acute Type I Aortic Dissection.     P

## 2020-06-16 NOTE — ANESTHESIA PROCEDURE NOTES
Central Line  Performed by: Nunu Ortiz MD  Authorized by: Nunu Ortiz MD     General Information and Staff    Procedure Start:  6/15/2020 7:35 PM  Procedure End:  6/15/2020 7:40 PM  Anesthesiologist:  Nunu Ortiz MD  Performed by:   Anesthesiol

## 2020-06-16 NOTE — OPERATIVE REPORT
[facility]    PATIENT'S NAME: Heather Regalado   ATTENDING PHYSICIAN: Remberto Goodrich M.D. OPERATING PHYSICIAN: Micah Gan M.D.    PATIENT ACCOUNT#:   [de-identified]    LOCATION:  [address]  MEDICAL RECORD #:   [id number]       DATE OF BIRTH:  [birth year] indications, risks, benefits, and alternatives were discussed with the patient and her . He gave consent. Cardiology attendings agreed w/ plan.     OPERATIVE TECHNIQUE:  The patient was taken into the operating room and was placed in a supine posit down to 14 degrees Celsius. An LV vent was placed to the right superior pulmonic vein. Retrograde cardioplegia was placed to the coronary sinus. The crossclamp was placed. Retrograde cold blood cardioplegia was given.   The heart was cooled with cold sa crossclamp was removed and cardiopulmonary bypass was discontinued. Examination of the distal aorta and arch shows no intimal entrance or exit sites. The aorta was then trimmed to the proximal arch.   A felt sandwich was used to reinforce the distal ascen Protamine was given. After 90 minutes, the patient developed rapid atrial fibrillation and required amiodarone bolus. The patient continues to be requiring pressors and epinephrine.   It was determined that nitric oxide alone would not be sufficient to he accounted for. The tourniquets were left in place as well as a clamp for the venous cannula. Total platelets given, 2 units of platelets. Total packed RBC 8 units. Two FFP. One cryoprecipitate.     The patient's , Dr. Kimberly Kelly, and the patient's Cathern Erp

## 2020-06-16 NOTE — CONSULTS
Critical Care H&P/Consult       NAME: Zahida [address] Ave: [id number] - MRN: [id number] - Age: 64year old - :  1960    Date of Admission: 6/15/2020 12:45 PM  Admission Diagnosis: Elevated coronary artery calcium score [R93.1]  Reason for Consult: time  losartan Potassium 50 MG Oral Tab, Take 1 tablet (50 mg total) by mouth once daily. , Disp: 90 tablet, Rfl: 0, 6/14/2020 at pm  LEVOTHYROXINE SODIUM 50 MCG Oral Tab, TAKE 1 TABLET BY MOUTH EVERY DAY, Disp: 90 tablet, Rfl: 1, 6/14/2020 at am  cholecalc file        Minutes per session: Not on file      Stress: Not on file    Relationships      Social connections:        Talks on phone: Not on file        Gets together: Not on file        Attends Gnosticism service: Not on file        Active member of club 30 tablet, Rfl: 0  AMLODIPINE BESYLATE 10 MG Oral Tab, TAKE 1 TABLET BY MOUTH EVERY DAY, Disp: 90 tablet, Rfl: 0  aspirin 81 MG Oral Tab, Take 1 tablet (81 mg total) by mouth daily. , Disp: , Rfl: 0  alendronate 70 MG Oral Tab, Take 1 tablet (70 mg total) b Continuous  Oximetry Probe Site Changed: No              Ventilator Settings: PRVC 12// FIO2 100/PEEP 5.   FiO2 (%): 100 %           Wt Readings from Last 3 Encounters:  06/16/20 : 151 lb 7.3 oz (68.7 kg)  03/13/20 : 132 lb (59.9 kg)  02/26/20 : 134 l following  4. Opacified left hemithorax  -due to atelectasis  -ET tube retracted w/ improvement in aeration  5. Hypernatremia  -change IVF to D5/45 vs D5W  -will discuss w/ endo  6. MOHSEN  -due to above   -monitor UOP and renal indices  7.  Anemia  -acute due

## 2020-06-16 NOTE — CONSULTS
General Medicine Consult      Reason for consult: post-op medical management     Consulted by: Dr. Nia Lemus    PCP: Jennie Horne DO      History of Present Illness: Patient is a 64year old female with PMH sig for DM type II, HTN, HLD who presented for [facility] 90 tablet, Rfl: 1  losartan Potassium 50 MG Oral Tab, Take 1 tablet (50 mg total) by mouth once daily. , Disp: 90 tablet, Rfl: 0  LEVOTHYROXINE SODIUM 50 MCG Oral Tab, TAKE 1 TABLET BY MOUTH EVERY DAY, Disp: 90 tablet, Rfl: 1  cholecalciferol (VITAMIN D3) 1 infusion 400 mcg/100 ml NS Stopped (06/16/20 0800)   • insulin regular     • dextrose       PRN Medication:[MAR Hold] HYDROcodone-acetaminophen **OR** [MAR Hold] HYDROcodone-acetaminophen, [MAR Hold] morphINE sulfate **OR** [MAR Hold] morphINE sulfate **OR sounds  CV: nl S1/S2  GI: soft/NT/ND +BS  Ext: warm to touch, + pulse  Neuro: sedated, not moving extremities  Skin: + ECMO lines in place, + dressing over chest      Diagnostics:   CBC/Chem  Recent Labs   Lab 06/15/20  1655 06/16/20  0510 06/16/20  1037 6/16/2020 at 10:59 AM          Xr Chest Ap Portable  (cpt=71045)    Result Date: 6/16/2020  PROCEDURE:  XR CHEST AP PORTABLE  (CPT=71045)  TECHNIQUE:  AP chest radiograph was obtained. COMPARISON:  None.   INDICATIONS:  ETT placement/ Chest tubes  PATIENT part of a cardiac CT evaluation. Please refer to Cardiologist report for contrast volume and technique. Dose reduction techniques were used.  Dose information is transmitted to the ACR ([employer]) Lucrecia Castellano [address] ([address] (CPT=71275) Once DATE:    6/15/20 COMPARISON:     XR DEXA BONE DENSITOMETRY (CPT=77080) 06/15/2017 [id number] Final INDICATIONS:   Aortic dissection TECHNIQUE: The patient was placed supine on the multidetector CT table.  Axial sections were obtained befor entire ascending thoracic aorta. The false lumen tracks along the medial and anterior surface of ascending thoracic aorta. Retained contrast from prior cardiac catheterization is noted in the false lumen.   The ascending aorta is enlarged due to the dissect cusp. It is a large, dominant vessel. A stent is noted in the proximal RCA. There is heavy calcification of the mid RCA which prevents assessment of angiographic stenosis. PERICARDIUM:  The pericardium appears normal in thickness.  There is no pericardial e II  - endo c/s for post CV surgery blood sugar control  - insulin gtt    Admitted to [address] records or previous hospital records reviewed. Appreciate this consultation. [facility] hospitalist to continue to follow patient while in house.  Please c

## 2020-06-16 NOTE — PHYSICAL THERAPY NOTE
Physical therapy consult requested s/p CABG x2 vessels. Chart reviewed and noted pt requiring return to OR emergently due to bleeding. Will sign off at this time. Please re-consult therapy as needed.

## 2020-06-16 NOTE — OCCUPATIONAL THERAPY NOTE
OT order received, Chart reviewed, pt brought back to [facility], OT will hold for today, OT will follow up as pt is approp.

## 2020-06-16 NOTE — ANESTHESIA PROCEDURE NOTES
Procedure Performed: MERLYN       Start Time:  6/15/2020 8:00 AM       End Time:   6/16/2020 5:16 AM    Preanesthesia Checklist:  Patient identified, IV assessed, risks and benefits discussed, monitors and equipment assessed, procedure being performed at surg Aorta    Ascending Aorta:  Size normal.  Dissection present. Plaque thickness less than 3 mm. Mobile plaque not present. Aortic Arch:  Dissection present. Mobile plaque not present. Descending Aorta:  Dissection not present.   Plaque thickn

## 2020-06-16 NOTE — CONSULTS
Clinical Nutrition  Dietitian consult received per cardiac rehab standing order. Pt to be educated by cardiac rehab staff and encouraged to attend outpatient classes taught by RD. RD available PRN.      Hu Dhillon, MS, RD, LDN  Clinical Dietitian  Pager

## 2020-06-16 NOTE — ANESTHESIA POSTPROCEDURE EVALUATION
Tame[address] 88 Patient Status:  Inpatient   Age/Gender 64year old female MRN [id number]   [facility] 6NE-A Attending Tapan Cullen MD   [facility] Day # 0 PCP Merissa Saha DO       Anesthesia Post-op Note    Procedure(s):  Emerg

## 2020-06-16 NOTE — CONSULTS
[facility] NEPHROLOGY PRELIMINARY CONSULT NOTE    CC: electrolyte abnormalities and MOHSEN    HPI: 65 yo f with history of HTN, HL, elevated coronary calcium score presented with GARRETT and LHC on 6/15/20 where she was found to have RCA and ALD disease.  Per notes, monique

## 2020-06-16 NOTE — PROCEDURES
[address] Location: Cath Lab    CSN [id number] MRN [id number]   Admission Date 6/15/2020 Procedure Date 6/16/2020   Attending Physician Leola Payna MD Procedure Physician Raghu Magdaleno MD         CARDIAC CATHETERIZATION REPORT

## 2020-06-16 NOTE — PROGRESS NOTES
Assumed care of patient at 0730. Patient vented and sedated on propofol. ECMO VA with initial flows 3.6 and sbp > 90 on levo, epi, dobs gtts. Around 0409 patient with  ECMO flows to 0.5 and SBP into 60's. Left Y chest tube with increased clotted blood.  Jewelene Dull

## 2020-06-16 NOTE — ANESTHESIA PREPROCEDURE EVALUATION
PRE-OP EVALUATION    Patient Name: Tj Encarnacion    Pre-op Diagnosis: S/P CABG, ASCENDING, ECMO, post surgical bleeding    Procedure(s):  Exploration of bleeding       Surgeon(s) and Role:     * Juanjose Nair MD - Primary    Pre-op vitals reviewed.   Temp: Intravenous, Continuous PRN  ondansetron HCl (ZOFRAN) injection 4 mg, 4 mg, Intravenous, Q6H PRN  famoTIDine (PEPCID) tab 20 mg, 20 mg, Oral, BID    Or  famoTIDine (PEPCID) injection 20 mg, 20 mg, Intravenous, BID  potassium chloride IVPB premix 20 mEq, 20 (DIPRIVAN) infusion, , Intravenous, Continuous PRN  Midazolam HCl (VERSED) 2 MG/2ML injection, , , PRN  sodium bicarbonate injection, , Intravenous, PRN  furosemide (LASIX) injection, , , PRN  [COMPLETED] coagulation factor VIIa recomb (NOVOSEVEN) injectio hypertension   High blood pressure  High cholesterol           Past Surgical History:   Procedure Laterality Date   • HEART ASCENDING AORTA REPLACEMENT N/A 6/15/2020    Performed by Rommel Velasquez MD at [address] [place] [address] N/A 6/15/2 06/05/2020     Lab Results   Component Value Date    INR 0.70 (L) 06/16/2020         Airway             Cardiovascular      Rhythm: regular  Rate: normal     Dental             Pulmonary                  (+) coarse breath sounds   Other findings  Patient i

## 2020-06-17 ENCOUNTER — APPOINTMENT (OUTPATIENT)
Dept: CV DIAGNOSTICS | Facility: HOSPITAL | Age: 60
DRG: 003 | End: 2020-06-17
Attending: INTERNAL MEDICINE
Payer: COMMERCIAL

## 2020-06-17 ENCOUNTER — APPOINTMENT (OUTPATIENT)
Dept: GENERAL RADIOLOGY | Facility: HOSPITAL | Age: 60
DRG: 003 | End: 2020-06-17
Attending: PHYSICIAN ASSISTANT
Payer: COMMERCIAL

## 2020-06-17 VITALS
HEIGHT: 59 IN | WEIGHT: 155.44 LBS | OXYGEN SATURATION: 100 % | SYSTOLIC BLOOD PRESSURE: 85 MMHG | BODY MASS INDEX: 31.34 KG/M2 | TEMPERATURE: 95 F | DIASTOLIC BLOOD PRESSURE: 61 MMHG | RESPIRATION RATE: 12 BRPM | HEART RATE: 58 BPM

## 2020-06-17 PROCEDURE — 71045 X-RAY EXAM CHEST 1 VIEW: CPT | Performed by: PHYSICIAN ASSISTANT

## 2020-06-17 PROCEDURE — 93308 TTE F-UP OR LMTD: CPT | Performed by: INTERNAL MEDICINE

## 2020-06-17 RX ORDER — ALBUMIN, HUMAN INJ 5% 5 %
SOLUTION INTRAVENOUS
Status: COMPLETED
Start: 2020-06-17 | End: 2020-06-17

## 2020-06-17 RX ORDER — DEXTROSE MONOHYDRATE 25 G/50ML
INJECTION, SOLUTION INTRAVENOUS
Status: COMPLETED
Start: 2020-06-17 | End: 2020-06-17

## 2020-06-17 RX ORDER — PHENYLEPHRINE HCL IN 0.9% NACL 50MG/250ML
PLASTIC BAG, INJECTION (ML) INTRAVENOUS
Status: COMPLETED
Start: 2020-06-17 | End: 2020-06-17

## 2020-06-17 RX ORDER — CISATRACURIUM BESYLATE 2 MG/ML
0.2 INJECTION, SOLUTION INTRAVENOUS ONCE
Status: DISCONTINUED | OUTPATIENT
Start: 2020-06-17 | End: 2020-06-17 | Stop reason: SDUPTHER

## 2020-06-17 RX ORDER — MINERAL OIL AND PETROLATUM 150; 830 MG/G; MG/G
OINTMENT OPHTHALMIC 2 TIMES DAILY
Status: DISCONTINUED | OUTPATIENT
Start: 2020-06-17 | End: 2020-06-17

## 2020-06-17 RX ORDER — ALBUMIN, HUMAN INJ 5% 5 %
500 SOLUTION INTRAVENOUS ONCE
Status: COMPLETED | OUTPATIENT
Start: 2020-06-17 | End: 2020-06-17

## 2020-06-17 RX ORDER — ALBUMIN, HUMAN INJ 5% 5 %
1000 SOLUTION INTRAVENOUS ONCE
Status: COMPLETED | OUTPATIENT
Start: 2020-06-17 | End: 2020-06-17

## 2020-06-17 RX ORDER — DOBUTAMINE HYDROCHLORIDE 400 MG/100ML
INJECTION INTRAVENOUS CONTINUOUS
Status: DISCONTINUED | OUTPATIENT
Start: 2020-06-17 | End: 2020-06-17

## 2020-06-17 RX ORDER — DEXTROSE MONOHYDRATE 50 MG/ML
INJECTION, SOLUTION INTRAVENOUS CONTINUOUS
Status: DISCONTINUED | OUTPATIENT
Start: 2020-06-17 | End: 2020-06-17

## 2020-06-17 RX ORDER — CEFAZOLIN SODIUM/WATER 2 G/20 ML
2 SYRINGE (ML) INTRAVENOUS EVERY 12 HOURS
Status: DISCONTINUED | OUTPATIENT
Start: 2020-06-17 | End: 2020-06-17

## 2020-06-17 RX ORDER — HEPARIN SODIUM 10000 [USP'U]/100ML
INJECTION, SOLUTION INTRAVENOUS
Status: DISCONTINUED
Start: 2020-06-17 | End: 2020-06-17

## 2020-06-17 RX ORDER — BUMETANIDE 0.25 MG/ML
2 INJECTION, SOLUTION INTRAMUSCULAR; INTRAVENOUS ONCE
Status: COMPLETED | OUTPATIENT
Start: 2020-06-17 | End: 2020-06-17

## 2020-06-17 RX ADMIN — DEXTROSE MONOHYDRATE: 50 INJECTION, SOLUTION INTRAVENOUS at 08:00:00

## 2020-06-17 RX ADMIN — DOBUTAMINE HYDROCHLORIDE 5 MCG/KG/MIN: 200 INJECTION INTRAVENOUS at 03:46:00

## 2020-06-17 RX ADMIN — CEFAZOLIN SODIUM/WATER 2 G: 2 G/20 ML SYRINGE (ML) INTRAVENOUS at 06:20:00

## 2020-06-17 RX ADMIN — DOBUTAMINE HYDROCHLORIDE 5 MCG/KG/MIN: 400 INJECTION INTRAVENOUS at 08:00:00

## 2020-06-17 RX ADMIN — CHLORHEXIDINE GLUCONATE 15 ML: 0.12 RINSE ORAL at 08:48:00

## 2020-06-17 RX ADMIN — MINERAL OIL AND PETROLATUM 5 G: 150; 830 OINTMENT OPHTHALMIC at 09:31:00

## 2020-06-17 RX ADMIN — PHENYLEPHRINE HCL IN 0.9% NACL: 50MG/250ML PLASTIC BAG, INJECTION (ML) INTRAVENOUS at 15:15:00

## 2020-06-17 RX ADMIN — ALBUMIN, HUMAN INJ 5% 1000 ML: 5 SOLUTION INTRAVENOUS at 14:30:00

## 2020-06-17 RX ADMIN — DEXTROSE MONOHYDRATE: 25 INJECTION, SOLUTION INTRAVENOUS at 13:00:00

## 2020-06-17 RX ADMIN — FAMOTIDINE 20 MG: 10 INJECTION, SOLUTION INTRAVENOUS at 08:47:00

## 2020-06-17 RX ADMIN — BUMETANIDE 2 MG: 0.25 INJECTION, SOLUTION INTRAMUSCULAR; INTRAVENOUS at 07:58:00

## 2020-06-17 RX ADMIN — ALBUMIN, HUMAN INJ 5% 500 ML: 5 SOLUTION INTRAVENOUS at 11:08:00

## 2020-06-17 RX ADMIN — DEXTROSE MONOHYDRATE: 25 INJECTION, SOLUTION INTRAVENOUS at 15:15:00

## 2020-06-17 NOTE — RESPIRATORY THERAPY NOTE
Received patient on full support, VC+ 12/230/60%/+5 with NO 40 ppm.  FiO2 weaned as tolerated; no other changes made overnight. Breath sounds are coarse, but with minimal secretions. Will continue to monitor closely.

## 2020-06-17 NOTE — PROGRESS NOTES
Received patient at 1930  Intubated/sedated on propofol  Pupillometer/ pupils equal/reactive  NSR on tele/ episodes of bradycardia (pacer firing)  sbp 100-110's (off levo)  Remains on epi, dobutamine, insulin gtts  Elevated blood glucose readings (improvin

## 2020-06-17 NOTE — CONSULTS
[facility]  Report of Consultation    Dina Escobar Patient Status:  Inpatient    1960 MRN [id number]   [facility] 6NE-A Attending Leola Payan MD   Hosp Day # 0 PCP Maral Ceron DO       Pt examined; chart reviewed, d/w Dr. Wai Kemp

## 2020-06-17 NOTE — PROGRESS NOTES
[facility] Hospitalist Progress Note     [facility]      SUBJECTIVE:  Patient being transferred to [facility]    OBJECTIVE:  Temp:  [97.5 °F (36.4 °C)-99.5 °F (37.5 °C)] 99.3 °F (37.4 °C)  Pulse:  [65-84] 79  Resp:  [12-24] 12  BP: ()/(53-90) 85/61  AO: (CPT=71045), 6/16/2020, 5:50 AM.  INDICATIONS:  post ecmo  PATIENT STATED HISTORY: (As transcribed by Technologist)  Patient offered no additional history at this time. FINDINGS:  Enteric tube with tip in the expected location of stomach.   Bilateral tho Finalized by: Silvia Llamas MD on 6/17/2020 at 5:46 AM          Xr Chest Ap Portable  (cpt=71045)    Result Date: 6/16/2020  PROCEDURE:  XR CHEST AP PORTABLE  (CPT=71045)  TECHNIQUE:  AP chest radiograph was obtained. COMPARISON:  None.   INDICATIONS images of the chest were obtained as part of a cardiac CT evaluation. Please refer to Cardiologist report for contrast volume and technique. Dose reduction techniques were used.  Dose information is transmitted to the ACR [employer] [facility]) N 6/15/2020  PROCEDURE:   CTA GATED THORACIC AORTA (CPT=71275) Once DATE:    6/15/20 COMPARISON:     XR DEXA BONE DENSITOMETRY (CPT=77080) 06/15/2017 [id number] Final INDICATIONS:   Aortic dissection TECHNIQUE: The patient was placed supine on the multidete AORTA:   A large dissection is noted involving the entire ascending thoracic aorta. The false lumen tracks along the medial and anterior surface of ascending thoracic aorta. Retained contrast from prior cardiac catheterization is noted in the false lumen. artery originates normally from the right coronary cusp. It is a large, dominant vessel. A stent is noted in the proximal RCA. There is heavy calcification of the mid RCA which prevents assessment of angiographic stenosis.  PERICARDIUM:  The pericardium brodie Once  dextrose 50 % injection, , ,   propofol (DIPRIVAN) infusion, 5-100 mcg/kg/min, Intravenous, Continuous  HYDROcodone-acetaminophen (NORCO)  MG per tab 1 tablet, 1 tablet, Oral, Q4H PRN    Or  HYDROcodone-acetaminophen (NORCO)  MG per tab 2 Mouth/Throat, BID  heparin (PORCINE) drip 14011xcsnv/250mL infusion, 500 Units/hr, Intravenous, Continuous         Assessment/Plan:     Patient is a 64year old female with PMH sig for DM type II, HTN, HLD who presented for planned angiogram which was done

## 2020-06-17 NOTE — CM/SW NOTE
Call received by charge rn, patient to be transferred to [facility] [phone] fax [phone].  Call to transfer center, they are aware of patient request, accepting physician Dr Tay Manuel, requesting face sheet and imagining to disk, face sheet faxed, c

## 2020-06-17 NOTE — PAYOR COMM NOTE
--------------  CONTINUED STAY REVIEW    Payor: [facility]  Subscriber #:  [id number]  Authorization Number: N/A    Admit date: 6/16/20  Admit time: 0751    Admitting Physician: Nkechi Moran MD  Attending Physician:  Nkechi Moran MD  [facility]

## 2020-06-17 NOTE — PROGRESS NOTES
Tameka 88 Patient Status:  Inpatient    1960 MRN [id number]   [facility] 6NE-A Attending Cyn Lai MD   Hosp Day # 1 PCP Katerina Michelle, DO     SUBJECTIVE: remains on full support     OBJECTIVE:  BP (!) 85/58 Continuous  •  Insulin Regular Human (NOVOLIN R) 100 Units in sodium chloride 0.9% 100 mL infusion, 1-40 Units/hr, Intravenous, Continuous  •  Dexmedetomidine HCl in NaCl (PRECEDEX) 400 MCG/100ML premix infusion, 0.2-1.5 mcg/kg/hr, Intravenous, Continuous dextrose 5 % 100 mL infusion, 0.2-1.5 mcg/kg/hr, Intravenous, Continuous  •  Vancomycin HCl (VANCOCIN) 1,000 mg in dextrose 5 % 250 mL IVPB, 1,000 mg, Intravenous, Q12H  •  Insulin Regular Human (NOVOLIN R) 250 Units in sodium chloride 0.9% 250 mL infusion EMR. CT and ETT in place. Bilateral near complete opacification/consolidations     ASSESSMENT/PLAN:  1.  Acute hypoxemic Resp Failure-due to STEMI with associated RV failure as well as possible TRALI after multiple blood product transfusions  - currently,

## 2020-06-17 NOTE — CONSULTS
[facility]      Endocrinology Consultation    Mgay Pouch Patient Status:  Inpatient    1960 MRN [id number]   [facility] 6NE-A Attending Evie Bell MD   Hosp Day # 0 PCP Cuong Webster DO     Reason for Consultation:  Binta Farr 0730  alendronate 70 MG Oral Tab, Take 1 tablet (70 mg total) by mouth once a week. Take on an empty stomach in the morning. No lying down or eating for at least 30 minutes. , Disp: 12 tablet, Rfl: 3, Taking  Calcium-Magnesium-Vitamin D (CALCIUM 1200+D3 OR) drugs.     Allergies:    Lisinopril              Coughing    Medications:    Current Facility-Administered Medications:   •  norepinephrine (LEVOPHED) 4 mg/250 ml premix infusion, 0.5-30 mcg/min, Intravenous, Continuous  •  DOBUTamine in D5W (DOBUTREX) 500 1 g, Intravenous, PRN  •  Magnesium Sulfate IVPB premix SOLN 2 g, 2 g, Intravenous, PRN  •  mupirocin (BACTROBAN) 2% nasal ointment OINT 1 Application, 1 Application, Nasal, BID  •  ceFAZolin sodium (ANCEF/KEFZOL) 2 GM/20ML premix IV syringe 2 g, 2 g, Intr 06/16/2020       Recent Labs     06/15/20  1305 06/16/20  0506 06/16/20  0603 06/16/20  0708 06/16/20  1042 06/16/20  1108 06/16/20  1152 06/16/20  1257 06/16/20  1658 06/16/20  1751 06/16/20  1855   PGLU 105* 334* 330* 284* 316* 309* 297* 269* 352* 326* 3

## 2020-06-17 NOTE — PROGRESS NOTES
[facility]  Endocrinology Progress Note    Mary Hodge Patient Status:  Inpatient    1960 MRN [id number]   [facility] 6NE-A Attending Tapan Cullen MD   Hosp Day # 1 PCP Merissa Saha DO     Subjective:  Transferring to [facility]. 06/16/20  1855   PGLU 105* 334* 330* 284* 316* 309* 297* 269* 352* 326* 324*      Ref. Range 3/13/2019 11:29 8/20/2019 08:02 3/13/2020 07:25   HEMOGLOBIN A1C Latest Ref Range: 4.0 - 5.6 % 6.7 (H) 6.4 (H) 6.9 (H)     Impression and Plan:  1.  Uncontrolled T2

## 2020-06-17 NOTE — PROGRESS NOTES
[facility]  Progress Note    Akilah Cervantes Patient Status:  Inpatient    1960 MRN [id number]   [facility] 6NE-A Attending Tarri Ganser, MD   Hosp Day # 1 PCP Shen Mason DO        Outpatient cardiologist:  Kamila Velarde support.       Objective:  BP (!) 85/58   Pulse 77   Temp 99.5 °F (37.5 °C)   Resp 19   Ht 4' 11\" (1.499 m)   Wt 155 lb 6.8 oz (70.5 kg)   SpO2 100%   BMI 31.39 kg/m²   Temp (24hrs), Av.9 °F (37.2 °C), Min:96.3 °F (35.7 °C), Max:99.5 °F (37.5 °C) ml premix infusion, 0.5-30 mcg/min, Intravenous, Continuous  DOBUTamine in D5W (DOBUTREX) 500 mg/250 ml infusion, 2.5-40 mcg/kg/min, Intravenous, Continuous  propofol (DIPRIVAN) infusion, 5-100 mcg/kg/min, Intravenous, Continuous  Insulin Regular Human (NO Intravenous, Once  EPINEPHrine HCl (ADRENALIN) 4 mg in dextrose 5 % 250 mL infusion, 1-10 mcg/min, Intravenous, Continuous  Dexmedetomidine HCl (PRECEDEX) 400 mcg in dextrose 5 % 100 mL infusion, 0.2-1.5 mcg/kg/hr, Intravenous, Continuous  Insulin Regular

## 2020-06-17 NOTE — PAYOR COMM NOTE
--------------  CONTINUED STAY REVIEW    Payor: [facility]  Subscriber #:  [id number]  Authorization Number: N/A    Admit date: 6/16/20  Admit time: 3743    Admitting Physician: Nkechi Moran MD  Attending Physician:  Nkechi Moran MD  [facility] vent/coughing--> propofol--> hypotension.   Started paralytic, weaning down pressor support.       Objective:  BP (!) 85/58   Pulse 77   Temp 99.5 °F (37.5 °C)   Resp 19   Ht 4' 11\" (1.499 m)   Wt 155 lb 6.8 oz (70.5 kg)   SpO2 100%   BMI 31.39 kg/m²   Tem in dextrose 5 % 100 mL IVPB, 1,000 mg, Intravenous, Q12H  norepinephrine (LEVOPHED) 4 mg/250 ml premix infusion, 0.5-30 mcg/min, Intravenous, Continuous  DOBUTamine in D5W (DOBUTREX) 500 mg/250 ml infusion, 2.5-40 mcg/kg/min, Intravenous, Continuous  propo syringe 2 g, 2 g, Intravenous, Q8H  Albumin Human (ALBUMINAR) 5 % solution 250 mL, 250 mL, Intravenous, Once  EPINEPHrine HCl (ADRENALIN) 4 mg in dextrose 5 % 250 mL infusion, 1-10 mcg/min, Intravenous, Continuous  Dexmedetomidine HCl (PRECEDEX) 400 mcg in removal of V-A ECMO, repair of right common femoral artery.     ASSISTING SURGEON:  Jeff Soto MD.     ASSISTANT:  Bonnie Monsivais PA-C.     INDICATIONS:  This patient underwent uncomplicated surgery in the middle the night for aortic dissection.   He updated by me and by Dr. Daniel Smith.     Dictated By Lori Chand MD  d:     06/16/2020 16:23:51        MEDICATIONS ADMINISTERED IN LAST 1 DAY:  Albumin Human (ALBUMINAR) 5 % solution 250 mL     Date Action Dose Route User    6/16/2020 1330 New Bag 250 mL Joselin Moscoso 6/17/2020 0752 Bolus from Bag 11.7 mg Intravenous noe Cardoso RN      CISATRACURIUM BOLUS FROM BAG 11.7 mg infusion     Date Action Dose Route User    6/17/2020 0730 Bolus from Bag 11.7 mg Intravenous noe Cardoso RN      Cisatracurium Vicki Key mcg/kg/min × 58.5 kg (Dosing Weight) Intravenous Hailey Delatorre RN      EPINEPHrine HCl (ADRENALIN) 4 mg in dextrose 5 % 250 mL infusion     Date Action Dose Route User    6/17/2020 0602 Rate/Dose Change 4 mcg/min Intravenous Russell Sen RN    6/17/2020 1521 Given 40 mg Intravenous Elisabeth Bermudez MD      gelatin adsorbable (GELFOAM SPONGE SIZE 100) 100 sponge     Date Action Dose Route User    6/16/2020 1502 Given 1 each Topical Fili Ragsdale MD      heparin (PORCINE) drip 45388cdnnc/250mL infusion chloride 0.9% 100 mL infusion     Date Action Dose Route User    6/16/2020 7010 New Bag 50 Units/hr Intravenous Suzi Pastor MD    6/16/2020 1155 New Bag 41 Units/hr Intravenous Justen Patterson RN      Midazolam HCl (VERSED) 2 MG/2ML injection 6/16/2020 2051 Rate/Dose Change 1 mcg/min Intravenous Adams Hernanedz RN    6/16/2020 2012 Rate/Dose Change 3 mcg/min Intravenous Adams Hernandez RN    6/16/2020 2000 Rate/Dose Verify 5 mcg/min Intravenous Adams Hernandez RN    6/16/2020 1935 Rate/Dose Change 5 mcg/mi Marbin Palma RN    6/17/2020 0610 Rate/Dose Change 50 mcg/kg/min × 58.5 kg Intravenous Art SUZANNE Sen    6/17/2020 0602 Rate/Dose Change 90 mcg/kg/min × 58.5 kg Intravenous Art SUZANNE Sen    6/17/2020 0557 Rate/Dose Change 85 mcg/kg/min × 58.5 kg Intravenous MEENAKSHI Boyer Bk Perrin MD      0.9% NaCl infusion     Date Action Dose Route User    6/16/2020 1145 New Bag (none) Intravenous Parvez Sher RN      Thrombin 5000 units spray kit     Date Action Dose Route User    6/16/2020 1603 Given 5000 Units Topical JUAN Peoples

## 2020-06-17 NOTE — RESPIRATORY THERAPY NOTE
Patient received on full support vent. With 40ppm nitic and ECMO. Patient being transferred to [facility]. Patient started having copious amount of ag red blood from ET tube right before transfer. BP and heart rate dropped. Dr Ciara Pond notified. suctioned 300 Statement Selected

## 2020-06-17 NOTE — OPERATIVE REPORT
[facility]    PATIENT'S NAME: Erinn Pineda   ATTENDING PHYSICIAN: Isaias Erickson M.D.    OPERATING PHYSICIAN: Nathalia Espinosa MD   PATIENT ACCOUNT#:   [id number]    LOCATION:  [address] [place] [address]  MEDICAL RECORD #:   [id number]       DATE OF BIRTH:  [birth year] areas on the heart. Bypass grafts were patent. RV, as of this morning, looked poor. RV cannula was removed. Clearly the chest could not be closed, but I elected not to place any wires through the sternum.   As such, we placed a Kerlix packing in the med

## 2020-06-17 NOTE — PLAN OF CARE
Assumed care of patient at 0730. Patient intubated and sedated on propofol. Pupils equal, reactive, and brisk- checked via pupillometer. Patient dyssynchronous with ventilator, Dr Osmin Sepulveda notified, patient paralyzed with nimbex bolus and gtt.  Train of four lucho

## 2020-06-17 NOTE — CONSULTS
[facility]    Report of Consultation    Shelbi Valdovinos Patient Status:  Inpatient    1960 MRN [id number]   [facility] 6NE-A Attending Taiwo Barney MD   Hosp Day # 1 PCP Martin Manuel DO       REASON FOR CONSULT:     MOHSEN    HISTORY ARTERY BYPASS GRAFT N/A 6/15/2020    Performed by Lashon Mcadams MD at [facility] N/A 6/15/2020    Performed by Lashon Mcadams MD at [address] 1.5 (Dosing Weight), Intravenous, PRN  •  artificial tears 83-15 % ophthalmic ointment, , Both Eyes, BID  •  norepinephrine (LEVOPHED) 4 mg/250 ml premix infusion, 0.5-30 mcg/min, Intravenous, Continuous  •  DOBUTamine in D5W (DOBUTREX) 500 mg/250 ml infusion, OINT 1 Application, 1 Application, Nasal, BID  •  ceFAZolin sodium (ANCEF/KEFZOL) 2 GM/20ML premix IV syringe 2 g, 2 g, Intravenous, Q8H  •  Albumin Human (ALBUMINAR) 5 % solution 250 mL, 250 mL, Intravenous, Once  •  EPINEPHrine HCl (ADRENALIN) 4 mg in de 06/17/2020    ANIONGAP 5 06/17/2020    GFR 86 06/24/2017    GFRNAA 31 (L) 06/17/2020    GFRAA 36 (L) 06/17/2020    CA 8.0 (L) 06/17/2020    OSMOCALC 315 (H) 06/17/2020    ALKPHO 51 06/17/2020     (H) 06/17/2020    ALT 18 06/17/2020    BILT 0.6 06/17 infiltrates noted    ASSESSMENT/PLAN:     MOHSEN: in the setting of contrast, cardiogenic shock, RV failure  -- d/w cardiology service and trying to minimize IVF  -- d/w  and patient's nephews re low UOP despite diuretics and Cr may be diluted given am

## 2020-06-18 NOTE — PAYOR COMM NOTE
--------------  DISCHARGE REVIEW    Payor: [address]  Subscriber #:  [id number]  Authorization Number: N/A    Admit date: 6/16/20  Admit time:  3275  Discharge Date: 6/17/2020  4:06 PM     Admitting Physician: Afua Rey MD  Attending y

## 2020-06-20 NOTE — OPERATIVE REPORT
[facility]    PATIENT'S NAME: Angel Lui   ATTENDING PHYSICIAN: Rg Aponte M.D.    OPERATING PHYSICIAN: Tiffany Heck MD   PATIENT ACCOUNT#:   [id number]    LOCATION:  [address] [place] [address]  MEDICAL RECORD #:   [id number]       DATE OF BIRTH:  [birth year] sternal tables which seemed in danger of lacerating the right ventricle. A Kerlix pack was placed and then a sterile Robinson-Chapincito patch was sutured in place, covered with antibiotic ointment and a sterile occlusive dressing.   The patient was returned to the I

## 2020-06-25 NOTE — DISCHARGE SUMMARY
[facility]  Discharge Summary    Tramaine Khoury Patient Status:  Inpatient    1960 MRN [id number]   [facility] 6NE-A Attending No att. providers found   Hosp Day # 1 PCP Diamond Dao DO     Date of Admission: 6/15/2020    Date of D was emergently brought back to the cath lab for re-look angiography in the setting of chest pain, hypotension/bradycardia, and inferior ST elevation. The proximal RCA was acutely occluded, presumed due to vessel dissection.   Attempt was made to revasculari normoactive  Extremities: No clubbing/cyanosis. Impression:  1. acute type I aortic dissection s/p #24 hemashield tube graft (6/15/20)  2.  CAD s/p 2v CABG (SVG-LAD, SVG-rPDA)  - acute RCA dissection following diagnostic angiography s/p rescue PCI comp Potassium 50 MG Oral Tab  Take 1 tablet (50 mg total) by mouth once daily. , Normal, Disp-90 tablet, R-0    LEVOTHYROXINE SODIUM 50 MCG Oral Tab  TAKE 1 TABLET BY MOUTH EVERY DAY, Normal, Disp-90 tablet, R-1    cholecalciferol (VITAMIN D3) 125 MCG (5000 UT)

## 2020-06-29 NOTE — PROGRESS NOTES
HPI:   Quintin Olivera is a 56year old female who presents for a well woman exam. She is a new patient to our practice. Was previously seeing Dr Shonna Hoskins. Symptoms: denies discharge, itching, burning or dysuria, is menopausal.    No LMP recorded.  Patient is post Blood Glucose Monitoring Suppl (FREESTYLE FREEDOM) Does not apply Kit use as directed, BID Disp: 1 Kit Rfl: 11   FREESTYLE FREEDOM KIT with needles Disp: 1box Rfl: 11      Past Medical History   Diagnosis Date   • DIABETES    • HYPERLIPIDEMIA    • HYPERT Temp([facility]) 97.1 °F (36.2 °C)  Resp 14  Ht 58.5\"  Wt 130 lb  BMI 26.70 kg/m2      Body mass index is 26.7 kg/(m^2).       GENERAL: well developed, well nourished,in no apparent distress  SKIN: no rashes,no suspicious lesions  HEENT: atraumatic, normocephalic medications. Salt reduction, discussed regular aerobic exercise. 5. Mixed hyperlipidemia  Check lipids. Continue atorvastatin. - Lipid Panel [E]; Future    6. Hypothyroidism, unspecified type  Check thyroid levels.   Continue levothyroxine  - Levot Biopsy Method: Dermablade

## (undated) DEVICE — VIOLET BRAIDED (POLYGLACTIN 910), SYNTHETIC ABSORBABLE SUTURE: Brand: COATED VICRYL

## (undated) DEVICE — OPEN HEART: Brand: MEDLINE INDUSTRIES, INC.

## (undated) DEVICE — GOWN,SIRUS,FAB REINF,RAGLAN,XL,STERILE: Brand: MEDLINE

## (undated) DEVICE — SOL LACT RINGERS 1000ML

## (undated) DEVICE — SUTURE POLYDEK 2-0

## (undated) DEVICE — Device

## (undated) DEVICE — CELL SAVER RESERVOIR BRAT

## (undated) DEVICE — STERILE POLYISOPRENE POWDER-FREE SURGICAL GLOVES: Brand: PROTEXIS

## (undated) DEVICE — GLOVE SURG TRIUMPH SZ 8

## (undated) DEVICE — SUTURE PROLENE 3-0 V-7

## (undated) DEVICE — SUTURE SILK 0 KS

## (undated) DEVICE — DRAIN SILICONE FLAT 10MM

## (undated) DEVICE — SUTURE PROLENE 2-0 MH

## (undated) DEVICE — SUTURE PROLENE 7-0 CC

## (undated) DEVICE — HEMOCLIP HORIZON LG 004200

## (undated) DEVICE — SUTURE WIRE SINGLE STERNOTOMY

## (undated) DEVICE — SUTURE PROLENE 5-0 C-1

## (undated) DEVICE — INTENDED TO BE USED TO OCCLUDE, RETRACT AND IDENTIFY ARTERIES, VEINS, TENDONS AND NERVES IN SURGICAL PROCEDURES: Brand: STERION®  VESSEL LOOP

## (undated) DEVICE — SYRINGE 30ML LL TIP

## (undated) DEVICE — TAPE UMBILICAL U11T

## (undated) DEVICE — CELL SAVER 5/5+ BOWL KIT-225ML: Brand: HAEMONETICS CELL SAVER 5/5+ SYSTEMS

## (undated) DEVICE — BLOWER CO2 MEDTRONIC/DLP 22150

## (undated) DEVICE — SUTURE SILK 2-0

## (undated) DEVICE — ABDOMINAL PAD: Brand: DERMACEA

## (undated) DEVICE — DRAIN RELIAVAC 100CC

## (undated) DEVICE — SOL  .9 1000ML BTL

## (undated) DEVICE — TRAY ENDOVEIN KTV16 HARVESTING

## (undated) DEVICE — CELL SAVER BAG 600ML 4R2023

## (undated) DEVICE — SUTURE VICRYL 2-0 CT-1

## (undated) DEVICE — SUTURE SILK 0 FSL

## (undated) DEVICE — ADULT, RADIOTRANSPARENT ELEMENT, COMPATIBLE W/ GRAY FLAT CONNECTOR: Brand: DEFIBRILLATION ELECTRODES

## (undated) DEVICE — SUTURE PROLENE 4-0 V-5

## (undated) DEVICE — OXYGENATOR QUADROX TAND HEART

## (undated) DEVICE — PDS II VLT 0 107CM AG ST3: Brand: ENDOLOOP

## (undated) DEVICE — TRUE SIZE CATHETER DOUBLE LUMEN: Brand: SWAN-GANZ TRUE SIZE

## (undated) DEVICE — ABSORBABLE HEMOSTAT (OXIDIZED REGENERATED CELLULOSE, U.S.P.): Brand: SURGICEL

## (undated) NOTE — LETTER
[facility] [address], [address]  Consent for Procedure/Sedation    Date:     Time:       1.  I authorize the performance upon Luba Sanches the following:cardiac catheterization, left ventricular cineangiography, bilateral select period, the physician will determine when the applicable recovery period ends for purposes of reinstating the Do Not Resuscitate (DNR) order.     Signature of Patient: ____________________________________________________    Signature of person authorized

## (undated) NOTE — LETTER
10/20/17        Kristina Gardner [id number]      Dear Shanna Avila,    [facility] records indicate that you have outstanding lab work and or testing that was ordered for you and has not yet been completed:          Lipid Panel      Hemogl

## (undated) NOTE — ED AVS SNAPSHOT
Rosalie Mercedes   MRN: [id number]    Department:  [facility] Emergency Department   Date of Visit:  11/11/2019           Disclosure     Insurance plans vary and the physician(s) referred by the ER may not be covered by your plan.  Please contact your tell this physician (or your personal doctor if your instructions are to return to your personal doctor) about any new or lasting problems. The primary care or specialist physician will see patients referred from the [facility] Emergency Department.  Josue Blackman

## (undated) NOTE — LETTER
Patient Name: Brianda Edgar  YOB: 1960          MRN number:  [id number]  Date:  8/18/2017  Referring Physician:  Claudia Painting     Progress Physical Therapy Summary    Pt has attended 10 visits in Physical Therapy.      Subjective: Luba rep WNL, no pain (was: 50% restriction in ROM and L lateral hip pain provocation)  FLORIN: R: WNL, no pain (was: mild ROM restrictions, provokes R hip pain); L: WNL, no pain (was: some ROM restrictions, provokes L hip pain)     Accessory motion: central and uni over a 90 day period. Treatment will include: Manual therapy: soft tissue and joint mobilizations to restore normal joint mechanics and decrease pain;  Therapeutic exercises including ROM, strengthening, stretching program; Neuromuscular re-education for pr

## (undated) NOTE — MR AVS SNAPSHOT
[address]  [address], [address] [id number] [phone]               Thank you for choosing us for your health care visit with Elias Courtney [address].   We are glad to serve you and happy to provide you with this Gastro Referral - In Network    Complete by:  As directed    Assoc Dx:  Screening for colon cancer [Z12.11]           OPHTHALMOLOGY - INTERNAL    Complete by:  As directed    Assoc Dx:  Type 2 diabetes mellitus with diabetic nephropathy, without long-term Phone:  [phone]   Fax:  [phone]             Felicia Rojo, [address]   [address] [place] [address]   Phone:  [phone]    Diagnoses:  Left hip pain   Order:  Op Referral To [facility]          Referral Orders Steven, [facility], [address]    [address] [place] [address] [address]   [address]    [place], [facility]:  [address] [facility] in [facility] in [facility] Visit for screening mammogram          Instructions and Information about Your Health     None      Allergies as of Jun 12, 2017     Lisinopril Coughing                Today's Vital Signs     BP Pulse Temp Height Weight BMI    124/80 mmHg 76 97.1 °F (36.2 Summaries. If you've been to the Emergency Department or your doctor's office, you can view your past visit information in [employer] by going to Visits < Visit Summaries. [employer] questions? Call [phone] for help.   [employer] is NOT to be used for urge

## (undated) NOTE — IP AVS SNAPSHOT
Patient Demographics     Address  [address]  [place] [address] Phone  [phone] [facility]) *Preferred*  [phone] (Work)  [phone] [facility]) E-mail Address  [email or web address]      Emergency Contact(s)     Name Relation Home Work Mobile cholecalciferol 125 MCG (5000 UT) Caps  Commonly known as:  VITAMIN D3      Take by mouth daily.    Jose Antonio Lin DO   [    ]   [    ]   [    ]   [    ]     Levothyroxine Sodium 50 MCG Tabs  Commonly known as:  SYNTHROID      TAKE 1 TABLET BY MOUTH EVERY DA 0.9% 250 mL infusion 06/17/20 0740 New Bag      [id number] Norepinephrine Bitartrate (LEVOPHED) 32 mg in dextrose 5 % 250 mL infusion 06/17/20 0800 New Bag      [id number] Vancomycin HCl (VANCOCIN) 1,000 mg in dextrose 5 % 100 mL IVPB 06/17/20 0924 New Bag Most Recent Value   Vitals  (!) 85/61 Filed at 06/17/2020 1200   Pulse  79 Filed at 06/17/2020 1200   Resp  12 Filed at 06/17/2020 1200   Temp  99.3 °F (37.4 °C) Filed at 06/17/2020 1200   SpO2  100 % Filed at 06/17/2020 1200      Patient's Most Recent We ---------                               -----------         ------                     CBC W/ DIFFERENTIAL[[id number]]          Abnormal            Final result                 Please view results for these tests on the individual orders.     Specimen Inform Blood — 06/17/20 0410          Components    Component Value Reference Range Flag Lab   Slide Review Slide reviewed, manual differential added [facility])            COMP METABOLIC PANEL (14) [[id number]] (Abnormal)  Resulted: 06/17/20 0446, Result Magnesium 1.6 1.6 - 2.6 mg/dL [facility])            PHOSPHORUS [[id number]] (Normal)  Resulted: 06/17/20 0446, Result status: Final result   Ordering provider:  Kenia Healy MD  06/16/20 2300 Resulting lab:  [facility] ([facility] BUN/CREA Ratio 8.1 10.0 - 20.0  [facility])   Calcium, Total 9.6 8.5 - 10.1 mg/dL [facility])   Calculated Osmolality 329 275 - 295 mOsm/kg [facility] ([facility])   GFR, Non- 39 >=60  [facility] ([facility])   GFR, -American 45 >=60 Joon Macedo [[id number]]  Resulted: 06/16/20 1747, Result status: Final result   Ordering provider:  Allen Osborn MD  06/16/20 1720 [facility])    Specimen Information    Type Source Collected On   Blood — 06 Type Source Collected On   Blood — 06/16/20 1557          Components    Component Value Reference Range Flag Lab   Fibrinogen 192 200 - 446 mg/dl  [facility]            PLATELET COUNT [[id number]] (Normal)  Resulted: 06/16/20 1617, Result status: Kasie discussed. The risks included, but were not limited to: bleeding, allergic reaction, infection, stroke, myocardial infarction (heart attack), and death.  Benefits of the procedure included: symptomatic improvement, diagnosis of heart disease and prevention reports that she has never smoked. She has never used smokeless tobacco. She reports that she does not drink alcohol or use drugs.      Allergies    Lisinopril              Coughing      Review of Systems:  Constitutional: negative for fevers  Eyes: negati 2:16 PM[DK.1]      Electronically signed by Marylene Rao, MD on 6/15/2020  2:18 PM   Attribution Rosario    DK. 1 - Marylene Rao, MD on 6/15/2020  2:16 PM                        Consults - MD Consult Notes      Consults signed by Michelle Gonzalez MD at 6/17/2 of 6/5/20 --> 1.78 mg/dl today. REVIEW OF SYSTEMS:     Please see HPI for pertinent positives. 10 point review of systems otherwise reviewed and negative.      HISTORY:     Past Medical History:   Diagnosis Date   • DIABETES    • Essential hypertension mcg/min, Intravenous, Continuous  •  Norepinephrine Bitartrate (LEVOPHED) 32 mg in dextrose 5 % 250 mL infusion, 0.5-30 mcg/min, Intravenous, Continuous  •  DOBUTamine in D5W (DOBUTREX) 1000 mg/250 ml infusion, 2.5-40 mcg/kg/min (Dosing Weight), Intravenou 0.1-4 mcg/kg/min, Intravenous, Continuous PRN  •  ondansetron HCl (ZOFRAN) injection 4 mg, 4 mg, Intravenous, Q6H PRN  •  famoTIDine (PEPCID) tab 20 mg, 20 mg, Oral, BID **OR** famoTIDine (PEPCID) injection 20 mg, 20 mg, Intravenous, BID  •  potassium chlo (37.5 °C)       Intake/Output Summary (Last 24 hours) at 6/17/2020 0803  Last data filed at 6/17/2020 0700  Gross per 24 hour   Intake 41482.93 ml   Output 3855 ml   Net 6999.93 ml     Wt Readings from Last 3 Encounters:  06/17/20 : 155 lb 6.8 oz (70.5 kg) Component Value Date    MALBP 2.7 08/20/2019    CREUR 164.24 08/20/2019     Lab Results   Component Value Date    COLORUR Straw 11/11/2019    CLARITY Clear 11/11/2019    SPECGRAVITY 1.008 11/11/2019    GLUUR Negative 11/11/2019    BILUR Negative 11/11/2019 Dann Quarles, [address]    6/17/2020  8:03 AM[SS.1]      Electronically signed by Katelyn Trent MD on 6/17/2020 12:39 PM   Attribution Rosario    SS. 1 - Katelyn Trent MD on 6/17/2020  8:03 AM  SS.2 - Katelyn Trent MD on 6/17/2020 12:31 PM Author:  Karl Lopez OT Service:  Rehab Author Type:  Occupational Therapist    Filed:  6/16/2020 10:05 AM Date of Service:  6/16/2020 10:04 AM Status:  Signed    :  Karl Lopez OT (Occupational Therapist)       OT order received,

## (undated) NOTE — LETTER
[facility] [address], [address]  Consent for Procedure/Sedation    Date: 06/15/2020    Time: 1730      1.  I authorize the performance upon Luba Sanches the following:cardiac catheterization, left ventricular cineangiography, bi period, the physician will determine when the applicable recovery period ends for purposes of reinstating the Do Not Resuscitate (DNR) order.     Signature of Patient: ____________________________________________________    Signature of person authorized